# Patient Record
Sex: FEMALE | Race: BLACK OR AFRICAN AMERICAN | NOT HISPANIC OR LATINO | Employment: OTHER | URBAN - METROPOLITAN AREA
[De-identification: names, ages, dates, MRNs, and addresses within clinical notes are randomized per-mention and may not be internally consistent; named-entity substitution may affect disease eponyms.]

---

## 2017-02-16 ENCOUNTER — ALLSCRIPTS OFFICE VISIT (OUTPATIENT)
Dept: OTHER | Facility: OTHER | Age: 82
End: 2017-02-16

## 2017-05-16 DIAGNOSIS — E78.5 HYPERLIPIDEMIA: ICD-10-CM

## 2017-05-16 DIAGNOSIS — I10 ESSENTIAL (PRIMARY) HYPERTENSION: ICD-10-CM

## 2017-05-23 ENCOUNTER — GENERIC CONVERSION - ENCOUNTER (OUTPATIENT)
Dept: OTHER | Facility: OTHER | Age: 82
End: 2017-05-23

## 2017-05-23 ENCOUNTER — LAB CONVERSION - ENCOUNTER (OUTPATIENT)
Dept: OTHER | Facility: OTHER | Age: 82
End: 2017-05-23

## 2017-05-23 LAB
A/G RATIO (HISTORICAL): 1.5 (CALC) (ref 1–2.5)
ALBUMIN SERPL BCP-MCNC: 4.5 G/DL (ref 3.6–5.1)
ALP SERPL-CCNC: 81 U/L (ref 33–130)
ALT SERPL W P-5'-P-CCNC: 19 U/L (ref 6–29)
AST SERPL W P-5'-P-CCNC: 25 U/L (ref 10–35)
BASOPHILS # BLD AUTO: 0.3 %
BASOPHILS # BLD AUTO: 18 CELLS/UL (ref 0–200)
BILIRUB SERPL-MCNC: 0.4 MG/DL (ref 0.2–1.2)
BILIRUB UR QL STRIP: NEGATIVE
BUN SERPL-MCNC: 9 MG/DL (ref 7–25)
BUN/CREA RATIO (HISTORICAL): NORMAL (CALC) (ref 6–22)
CALCIUM SERPL-MCNC: 9.9 MG/DL (ref 8.6–10.4)
CHLORIDE SERPL-SCNC: 104 MMOL/L (ref 98–110)
CHOLEST SERPL-MCNC: 157 MG/DL (ref 125–200)
CHOLEST/HDLC SERPL: 2.1 (CALC)
CO2 SERPL-SCNC: 28 MMOL/L (ref 20–31)
COLOR UR: YELLOW
COMMENT (HISTORICAL): CLEAR
CREAT SERPL-MCNC: 0.79 MG/DL (ref 0.6–0.88)
DEPRECATED RDW RBC AUTO: 14.3 % (ref 11–15)
EGFR AFRICAN AMERICAN (HISTORICAL): 82 ML/MIN/1.73M2
EGFR-AMERICAN CALC (HISTORICAL): 71 ML/MIN/1.73M2
EOSINOPHIL # BLD AUTO: 1.6 %
EOSINOPHIL # BLD AUTO: 98 CELLS/UL (ref 15–500)
FECAL OCCULT BLOOD DIAGNOSTIC (HISTORICAL): NEGATIVE
GAMMA GLOBULIN (HISTORICAL): 3 G/DL (CALC) (ref 1.9–3.7)
GLUCOSE (HISTORICAL): 80 MG/DL (ref 65–99)
GLUCOSE (HISTORICAL): NEGATIVE
HCT VFR BLD AUTO: 36.2 % (ref 35–45)
HDLC SERPL-MCNC: 76 MG/DL
HGB BLD-MCNC: 12.3 G/DL (ref 11.7–15.5)
KETONES UR STRIP-MCNC: NEGATIVE MG/DL
LDL CHOLESTEROL (HISTORICAL): 70 MG/DL (CALC)
LEUKOCYTE ESTERASE UR QL STRIP: NEGATIVE
LYMPHOCYTES # BLD AUTO: 1549 CELLS/UL (ref 850–3900)
LYMPHOCYTES # BLD AUTO: 25.4 %
MCH RBC QN AUTO: 29.9 PG (ref 27–33)
MCHC RBC AUTO-ENTMCNC: 34.1 G/DL (ref 32–36)
MCV RBC AUTO: 87.8 FL (ref 80–100)
MONOCYTES # BLD AUTO: 354 CELLS/UL (ref 200–950)
MONOCYTES (HISTORICAL): 5.8 %
NEUTROPHILS # BLD AUTO: 4081 CELLS/UL (ref 1500–7800)
NEUTROPHILS # BLD AUTO: 66.9 %
NITRITE UR QL STRIP: NEGATIVE
NON-HDL-CHOL (CHOL-HDL) (HISTORICAL): 81 MG/DL (CALC)
PH UR STRIP.AUTO: 7 [PH] (ref 5–8)
PLATELET # BLD AUTO: 314 THOUSAND/UL (ref 140–400)
PMV BLD AUTO: 8.1 FL (ref 7.5–12.5)
POTASSIUM SERPL-SCNC: 3.6 MMOL/L (ref 3.5–5.3)
PROT UR STRIP-MCNC: NEGATIVE MG/DL
RBC # BLD AUTO: 4.12 MILLION/UL (ref 3.8–5.1)
SODIUM SERPL-SCNC: 141 MMOL/L (ref 135–146)
SP GR UR STRIP.AUTO: 1 (ref 1–1.03)
TOTAL PROTEIN (HISTORICAL): 7.5 G/DL (ref 6.1–8.1)
TRIGL SERPL-MCNC: 56 MG/DL
TSH SERPL DL<=0.05 MIU/L-ACNC: 0.9 MIU/L (ref 0.4–4.5)
WBC # BLD AUTO: 6.1 THOUSAND/UL (ref 3.8–10.8)

## 2017-06-09 ENCOUNTER — GENERIC CONVERSION - ENCOUNTER (OUTPATIENT)
Dept: OTHER | Facility: OTHER | Age: 82
End: 2017-06-09

## 2017-08-28 ENCOUNTER — ALLSCRIPTS OFFICE VISIT (OUTPATIENT)
Dept: OTHER | Facility: OTHER | Age: 82
End: 2017-08-28

## 2017-11-28 DIAGNOSIS — E78.5 HYPERLIPIDEMIA: ICD-10-CM

## 2017-12-13 ENCOUNTER — GENERIC CONVERSION - ENCOUNTER (OUTPATIENT)
Dept: OTHER | Facility: OTHER | Age: 82
End: 2017-12-13

## 2017-12-28 ENCOUNTER — GENERIC CONVERSION - ENCOUNTER (OUTPATIENT)
Dept: OTHER | Facility: OTHER | Age: 82
End: 2017-12-28

## 2017-12-28 ENCOUNTER — LAB CONVERSION - ENCOUNTER (OUTPATIENT)
Dept: OTHER | Facility: OTHER | Age: 82
End: 2017-12-28

## 2017-12-28 LAB
A/G RATIO (HISTORICAL): 1.8 (CALC) (ref 1–2.5)
ALBUMIN SERPL BCP-MCNC: 4.6 G/DL (ref 3.6–5.1)
ALP SERPL-CCNC: 63 U/L (ref 33–130)
ALT SERPL W P-5'-P-CCNC: 19 U/L (ref 6–29)
AST SERPL W P-5'-P-CCNC: 24 U/L (ref 10–35)
BILIRUB SERPL-MCNC: 0.4 MG/DL (ref 0.2–1.2)
BUN SERPL-MCNC: 14 MG/DL (ref 7–25)
BUN/CREA RATIO (HISTORICAL): NORMAL (CALC) (ref 6–22)
CALCIUM SERPL-MCNC: 10.1 MG/DL (ref 8.6–10.4)
CHLORIDE SERPL-SCNC: 105 MMOL/L (ref 98–110)
CHOLEST SERPL-MCNC: 166 MG/DL
CHOLEST/HDLC SERPL: 2.1 (CALC)
CO2 SERPL-SCNC: 26 MMOL/L (ref 20–31)
CREAT SERPL-MCNC: 0.83 MG/DL (ref 0.6–0.88)
EGFR AFRICAN AMERICAN (HISTORICAL): 77 ML/MIN/1.73M2
EGFR-AMERICAN CALC (HISTORICAL): 67 ML/MIN/1.73M2
GAMMA GLOBULIN (HISTORICAL): 2.6 G/DL (CALC) (ref 1.9–3.7)
GLUCOSE (HISTORICAL): 81 MG/DL (ref 65–99)
HDLC SERPL-MCNC: 78 MG/DL
LDL CHOLESTEROL (HISTORICAL): 70 MG/DL (CALC)
NON-HDL-CHOL (CHOL-HDL) (HISTORICAL): 88 MG/DL (CALC)
POTASSIUM SERPL-SCNC: 3.8 MMOL/L (ref 3.5–5.3)
SODIUM SERPL-SCNC: 143 MMOL/L (ref 135–146)
TOTAL PROTEIN (HISTORICAL): 7.2 G/DL (ref 6.1–8.1)
TRIGL SERPL-MCNC: 96 MG/DL
TSH SERPL DL<=0.05 MIU/L-ACNC: 0.89 MIU/L (ref 0.4–4.5)

## 2018-01-12 VITALS
WEIGHT: 126.25 LBS | HEIGHT: 59 IN | DIASTOLIC BLOOD PRESSURE: 80 MMHG | SYSTOLIC BLOOD PRESSURE: 140 MMHG | BODY MASS INDEX: 25.45 KG/M2 | TEMPERATURE: 98.8 F

## 2018-01-13 NOTE — RESULT NOTES
Verified Results  * DXA BONE DENSITY SPINE HIP AND PELVIS [id number] 10:35AM Leonel Everett Order Number: [id number]     Test Name Result Flag Reference   DXA BONE DENSITY SPINE HIP AND PELVIS (Report)     CENTRAL DXA SCAN     CLINICAL HISTORY:  80year old post-menopausal Black female risk factors include postmenopausal, estrogen deficiency  TECHNIQUE: Bone densitometry was performed using a [employer] W bone densitometer  Regions of interest appear properly placed  There are no obvious fractures or other confounding variables which could limit the study  Significant degenerative or    osteoarthritic changes are noted on the spine image and in my view eliminating the spine result as reliable  L3 and L4 are excluded from the computer generated result  COMPARISON: Baseline     RESULTS:    LUMBAR SPINE: L1-L2:   BMD 0 891 gm/cm2   T-score below normal, -1 7   Z-score +1 1     LEFT TOTAL HIP:   BMD 0 856 gm/cm2   T-score below normal, -1 1   Z-score 0 4     LEFT FEMORAL NECK:   BMD 0 741 gm/cm2   T-score below normal, -1 5   Z-score 0 3     The forearm BMD is 0 648 and the T score is normal, -0 8  The Z score is +2 3  The Frax score in this patient identifies the risk of a major osteoporotic fracture in the next 10 years at 5 2% and a hip fracture risk of 1 0%, below treatment thresholds  IMPRESSION:   1  Based on the [facility] classification, this study identifies osteopenia at the femoral neck, total hip, and spine areas and the patient remains at low risk for fracture  2  A daily intake of calcium of at least 1200 mg and vitamin D, 800-1000 IU, as well as weight bearing and muscle strengthening exercise, fall prevention and avoidance of tobacco and excessive alcohol intake as basic preventive measures are recommended  3  Repeat DXA scan on the same equipment in 24-36 months as clinically indicated        WHO CLASSIFICATION:   Normal (a T-score of -1 0 or higher)   Low bone mineral density (a T-score of less than -1 0 but higher than -2 5)   Osteoporosis (a T-score of -2 5 or less)   Severe osteoporosis (a T-score of -2 5 or less with a fragility fracture)     Thank you for allowing us the opportunity to participate in your patient care  The expanded DEXA report will no longer be arriving in your mail  If you desire to view the full report please contact [address] [place] [address] or access the PACS system  Workstation performed: [id number]     Signed by:   Bria Heredia MD   8/29/16       Discussion/Summary   dexa shows some osteopenia  She should be taking 1500 mg of calcium and 800 units of vitamin D per day

## 2018-01-13 NOTE — RESULT NOTES
Verified Results  (1) CBC/PLT/DIFF 12Apr2016 10:34AM Rojelio Ivans     Test Name Result Flag Reference   WHITE BLOOD CELL COUNT 5 0 Thousand/uL  3 8-10 8   RED BLOOD CELL COUNT 4 23 Million/uL  3 80-5 10   HEMOGLOBIN 12 3 g/dL  11 7-15 5   HEMATOCRIT 38 0 %  35 0-45 0   MCV 89 8 fL  80 0-100 0   MCH 29 0 pg  27 0-33 0   MCHC 32 3 g/dL  32 0-36 0   RDW 14 7 %  11 0-15 0   PLATELET COUNT 311 Thousand/uL  140-400   MPV 8 8 fL  7 5-11 5   ABSOLUTE NEUTROPHILS 2565 cells/uL  4200-9516   ABSOLUTE LYMPHOCYTES 1860 cells/uL  850-3900   ABSOLUTE MONOCYTES 485 cells/uL  200-950   ABSOLUTE EOSINOPHILS 70 cells/uL     ABSOLUTE BASOPHILS 20 cells/uL  0-200   NEUTROPHILS 51 3 %     LYMPHOCYTES 37 2 %     MONOCYTES 9 7 %     EOSINOPHILS 1 4 %     BASOPHILS 0 4 %       (1) COMPREHENSIVE METABOLIC PANEL [id number] 69:04QC Rojelio Ivans     Test Name Result Flag Reference   GLUCOSE 78 mg/dL  65-99   Fasting reference interval   UREA NITROGEN (BUN) 16 mg/dL  7-25   CREATININE 0 84 mg/dL  0 60-0 93   For patients >52years of age, the reference limit  for Creatinine is approximately 13% higher for people  identified as -American  eGFR NON-AFR   AMERICAN 66 mL/min/1 73m2  > OR = 60   eGFR AFRICAN AMERICAN 77 mL/min/1 73m2  > OR = 60   BUN/CREATININE RATIO   7-57   NOT APPLICABLE (calc)   SODIUM 140 mmol/L  135-146   POTASSIUM 3 8 mmol/L  3 5-5 3   CHLORIDE 104 mmol/L     CARBON DIOXIDE 26 mmol/L  19-30   CALCIUM 10 0 mg/dL  8 6-10 4   PROTEIN, TOTAL 7 2 g/dL  6 1-8 1   ALBUMIN 4 2 g/dL  3 6-5 1   GLOBULIN 3 0 g/dL (calc)  1 9-3 7   ALBUMIN/GLOBULIN RATIO 1 4 (calc)  1 0-2 5   BILIRUBIN, TOTAL 0 4 mg/dL  0 2-1 2   ALKALINE PHOSPHATASE 82 U/L     AST 22 U/L  10-35   ALT 14 U/L  6-29     (1) LIPID PANEL, FASTING 12Apr2016 10:34AM Rojelio Ivans     Test Name Result Flag Reference   CHOLESTEROL, TOTAL 171 mg/dL  125-200   HDL CHOLESTEROL 70 mg/dL  > OR = 46   TRIGLICERIDES 74 mg/dL  <361   LDL-CHOLESTEROL 86 mg/dL (calc)  <130   Desirable range <100 mg/dL for patients with CHD or  diabetes and <70 mg/dL for diabetic patients with  known heart disease  CHOL/HDLC RATIO 2 4 (calc)  < OR = 5 0   NON HDL CHOLESTEROL 101 mg/dL (calc)     Target for non-HDL cholesterol is 30 mg/dL higher than   LDL cholesterol target  (1) T4, FREE [id number] 10:34AM Noreene Lease     Test Name Result Flag Reference   T4, FREE 1 0 ng/dL  0 8-1 8     (Q) TSH, 3RD GENERATION [id number] 10:34AM Noreene Lease     Test Name Result Flag Reference   TSH 0 82 mIU/L  0 40-4 50     (Q) URINALYSIS REFLEX [id number] 10:34AM Noreene Lease   REPORT COMMENT:  FASTING:YES     Test Name Result Flag Reference   COLOR YELLOW  YELLOW   APPEARANCE CLEAR  CLEAR   SPECIFIC GRAVITY 1 006  1 001-1 035   PH 7 5  5 0-8 0   GLUCOSE NEGATIVE  NEGATIVE   BILIRUBIN NEGATIVE  NEGATIVE   KETONES NEGATIVE  NEGATIVE   OCCULT BLOOD NEGATIVE  NEGATIVE   PROTEIN NEGATIVE  NEGATIVE   NITRITE NEGATIVE  NEGATIVE   LEUKOCYTE ESTERASE NEGATIVE  NEGATIVE       Discussion/Summary   Lab work was pretty good  Continue current medications  I will discuss this with her when she comes in next month for her checkup

## 2018-01-14 VITALS — WEIGHT: 128 LBS | SYSTOLIC BLOOD PRESSURE: 138 MMHG | BODY MASS INDEX: 25.85 KG/M2 | DIASTOLIC BLOOD PRESSURE: 80 MMHG

## 2018-01-15 NOTE — RESULT NOTES
Verified Results  DXA FOLLOW UP (NO CHARGE) [id number] 10:51AM Goyo Virgen Order Number: [id number]     Test Name Result Flag Reference   DXA FOLLOW UP (NO CHARGE) (Report)     CENTRAL DXA SCAN     CLINICAL HISTORY:  80year old post-menopausal Black female risk factors include postmenopausal, estrogen deficiency  TECHNIQUE: Bone densitometry was performed using a [employer]'s W bone densitometer  Regions of interest appear properly placed  There are no obvious fractures or other confounding variables which could limit the study  Significant degenerative or    osteoarthritic changes are noted on the spine image and in my view eliminating the spine result as reliable  L3 and L4 are excluded from the computer generated result  COMPARISON: Baseline     RESULTS:    LUMBAR SPINE: L1-L2:   BMD 0 891 gm/cm2   T-score below normal, -1 7   Z-score +1 1     LEFT TOTAL HIP:   BMD 0 856 gm/cm2   T-score below normal, -1 1   Z-score 0 4     LEFT FEMORAL NECK:   BMD 0 741 gm/cm2   T-score below normal, -1 5   Z-score 0 3     The forearm BMD is 0 648 and the T score is normal, -0 8  The Z score is +2 3  The Frax score in this patient identifies the risk of a major osteoporotic fracture in the next 10 years at 5 2% and a hip fracture risk of 1 0%, below treatment thresholds  IMPRESSION:   1  Based on the [facility] classification, this study identifies osteopenia at the femoral neck, total hip, and spine areas and the patient remains at low risk for fracture  2  A daily intake of calcium of at least 1200 mg and vitamin D, 800-1000 IU, as well as weight bearing and muscle strengthening exercise, fall prevention and avoidance of tobacco and excessive alcohol intake as basic preventive measures are recommended  3  Repeat DXA scan on the same equipment in 24-36 months as clinically indicated        WHO CLASSIFICATION:   Normal (a T-score of -1 0 or higher)   Low bone mineral density (a T-score of less than -1 0 but higher than -2 5)   Osteoporosis (a T-score of -2 5 or less)   Severe osteoporosis (a T-score of -2 5 or less with a fragility fracture)     Thank you for allowing us the opportunity to participate in your patient care  The expanded DEXA report will no longer be arriving in your mail  If you desire to view the full report please contact [address] [place] [address] or access the PACS system  Workstation performed: [id number]     Signed by:   Karina Jaffe MD   8/30/16       Discussion/Summary   Patient has mild osteopenia, low risk for fracture  Would still recommend she take 1500 mg of calcium and 800 units of vitamin D per day

## 2018-01-15 NOTE — RESULT NOTES
Discussion/Summary   Lab work all okay  Continue current medications  Keep scheduled appointment in July  Verified Results  (1) COMPREHENSIVE METABOLIC PANEL [id number] 55:35FV [employer]     Test Name Result Flag Reference   GLUCOSE 80 mg/dL  65-99   Fasting reference interval   UREA NITROGEN (BUN) 9 mg/dL  7-25   CREATININE 0 79 mg/dL  0 60-0 88   For patients >52years of age, the reference limit  for Creatinine is approximately 13% higher for people  identified as -American  eGFR NON-AFR  AMERICAN 71 mL/min/1 73m2  > OR = 60   eGFR AFRICAN AMERICAN 82 mL/min/1 73m2  > OR = 60   BUN/CREATININE RATIO   5-56   NOT APPLICABLE (calc)   SODIUM 141 mmol/L  135-146   POTASSIUM 3 6 mmol/L  3 5-5 3   CHLORIDE 104 mmol/L     CARBON DIOXIDE 28 mmol/L  20-31   CALCIUM 9 9 mg/dL  8 6-10 4   PROTEIN, TOTAL 7 5 g/dL  6 1-8 1   ALBUMIN 4 5 g/dL  3 6-5 1   GLOBULIN 3 0 g/dL (calc)  1 9-3 7   ALBUMIN/GLOBULIN RATIO 1 5 (calc)  1 0-2 5   BILIRUBIN, TOTAL 0 4 mg/dL  0 2-1 2   ALKALINE PHOSPHATASE 81 U/L     AST 25 U/L  10-35   ALT 19 U/L  6-29     (1) LIPID PANEL, FASTING [id number] 11:20AM [employer]     Test Name Result Flag Reference   CHOLESTEROL, TOTAL 157 mg/dL  125-200   HDL CHOLESTEROL 76 mg/dL  > OR = 46   TRIGLICERIDES 56 mg/dL  <934   LDL-CHOLESTEROL 70 mg/dL (calc)  <130   Desirable range <100 mg/dL for patients with CHD or  diabetes and <70 mg/dL for diabetic patients with  known heart disease  CHOL/HDLC RATIO 2 1 (calc)  < OR = 5 0   NON HDL CHOLESTEROL 81 mg/dL (calc)     Target for non-HDL cholesterol is 30 mg/dL higher than   LDL cholesterol target       (1) CBC/PLT/DIFF [id number] 11:20AM [employer]     Test Name Result Flag Reference   WHITE BLOOD CELL COUNT 6 1 Thousand/uL  3 8-10 8   RED BLOOD CELL COUNT 4 12 Million/uL  3 80-5 10   HEMOGLOBIN 12 3 g/dL  11 7-15 5   HEMATOCRIT 36 2 %  35 0-45 0   MCV 87 8 fL  80 0-100 0   MCH 29 9 pg  27 0-33 0   MCHC 34 1 g/dL  32 0-36 0 RDW 14 3 %  11 0-15 0   PLATELET COUNT 663 Thousand/uL  140-400   ABSOLUTE NEUTROPHILS 4081 cells/uL  0681-4984   ABSOLUTE LYMPHOCYTES 1549 cells/uL  850-3900   ABSOLUTE MONOCYTES 354 cells/uL  200-950   ABSOLUTE EOSINOPHILS 98 cells/uL     ABSOLUTE BASOPHILS 18 cells/uL  0-200   NEUTROPHILS 66 9 %     LYMPHOCYTES 25 4 %     MONOCYTES 5 8 %     EOSINOPHILS 1 6 %     BASOPHILS 0 3 %     MPV 8 1 fL  7 5-12 5     (Q) TSH, 3RD GENERATION [id number] 11:20AM Alma Delia Champion     Test Name Result Flag Reference   TSH 0 90 mIU/L  0 40-4 50     (Q) URINALYSIS REFLEX [id number] 11:20AM Alma Delia Champion   REPORT COMMENT:  FASTING:YES     Test Name Result Flag Reference   COLOR YELLOW  YELLOW   APPEARANCE CLEAR  CLEAR   SPECIFIC GRAVITY 1 002  1 001-1 035   PH 7 0  5 0-8 0   GLUCOSE NEGATIVE  NEGATIVE   BILIRUBIN NEGATIVE  NEGATIVE   KETONES NEGATIVE  NEGATIVE   OCCULT BLOOD NEGATIVE  NEGATIVE   PROTEIN NEGATIVE  NEGATIVE   NITRITE NEGATIVE  NEGATIVE   LEUKOCYTE ESTERASE NEGATIVE  NEGATIVE

## 2018-01-22 ENCOUNTER — ALLSCRIPTS OFFICE VISIT (OUTPATIENT)
Dept: OTHER | Facility: OTHER | Age: 83
End: 2018-01-22

## 2018-01-23 NOTE — RESULT NOTES
Discussion/Summary   lab ok, continue current meds  will discuss at ov next months  Verified Results  (1) COMPREHENSIVE METABOLIC PANEL [id number] 31:89NZ Shadi Pert     Test Name Result Flag Reference   GLUCOSE 81 mg/dL  65-99   Fasting reference interval   UREA NITROGEN (BUN) 14 mg/dL  7-25   CREATININE 0 83 mg/dL  0 60-0 88   For patients >52years of age, the reference limit  for Creatinine is approximately 13% higher for people  identified as -American  eGFR NON-AFR  AMERICAN 67 mL/min/1 73m2  > OR = 60   eGFR AFRICAN AMERICAN 77 mL/min/1 73m2  > OR = 60   BUN/CREATININE RATIO   0-86   NOT APPLICABLE (calc)   SODIUM 143 mmol/L  135-146   POTASSIUM 3 8 mmol/L  3 5-5 3   CHLORIDE 105 mmol/L     CARBON DIOXIDE 26 mmol/L  20-31   CALCIUM 10 1 mg/dL  8 6-10 4   PROTEIN, TOTAL 7 2 g/dL  6 1-8 1   ALBUMIN 4 6 g/dL  3 6-5 1   GLOBULIN 2 6 g/dL (calc)  1 9-3 7   ALBUMIN/GLOBULIN RATIO 1 8 (calc)  1 0-2 5   BILIRUBIN, TOTAL 0 4 mg/dL  0 2-1 2   ALKALINE PHOSPHATASE 63 U/L     AST 24 U/L  10-35   ALT 19 U/L  6-29     (1) LIPID PANEL, FASTING [id number] 11:30AM Shadi Pert     Test Name Result Flag Reference   CHOLESTEROL, TOTAL 166 mg/dL  <200   HDL CHOLESTEROL 78 mg/dL  >79   TRIGLICERIDES 96 mg/dL  <014   LDL-CHOLESTEROL 70 mg/dL (calc)     Reference range: <100     Desirable range <100 mg/dL for patients with CHD or  diabetes and <70 mg/dL for diabetic patients with  known heart disease  LDL-C is now calculated using the [employer]agar-Page   calculation, which is a validated novel method providing   better accuracy than the Friedewald equation in the   estimation of LDL-C  Adron Cal sanchez al  [place] 192922;756(63): [phone]   (http://[employer]/faq/RUB775)   CHOL/HDLC RATIO 2 1 (calc)  <5 0   NON HDL CHOLESTEROL 88 mg/dL (calc)  <130   For patients with diabetes plus 1 major ASCVD risk   factor, treating to a non-HDL-C goal of <100 mg/dL   (LDL-C of <70 mg/dL) is considered a therapeutic   option       (Q) TSH, 3RD GENERATION [id number] 11:30AM Patti Morales   REPORT COMMENT:  FASTING:YES     Test Name Result Flag Reference   TSH 0 89 mIU/L  0 40-4 50

## 2018-01-23 NOTE — PROGRESS NOTES
Assessment   1  Essential hypertension (401 9) (I10)   2  Hyperlipidemia (272 4) (E78 5)   3  Anxiety (300 00) (F41 9)   4  Osteoarthritis (715 90) (M19 90)   5  Osteoporosis (733 00) (M81 0)   6  Thinning hair (704 00) (L65 9)    Plan   Essential hypertension    · Amlodipine Besy-Benazepril HCl - 10-20 MG Oral Capsule; TAKE ONE    CAPSULE BY MOUTH ONCE DAILY  Hyperlipidemia    · Simvastatin 40 MG Oral Tablet (Zocor); TAKE ONE TABLET BY MOUTH ONCE    DAILY   · (1) CBC/PLT/DIFF; Status:Active; Requested for:61Ffr2043;    · (1) COMPREHENSIVE METABOLIC PANEL; Status:Active; Requested for:22Cyb1969;    · (1) LIPID PANEL, FASTING; Status:Active; Requested for:10Lzn7736;    · (1) TSH; Status:Active; Requested for:67Oji4495;   Osteoporosis, Thinning hair    · (1) VITAMIN D 25-HYDROXY; Status:Active; Requested for:68Bjc2840; Thinning hair    · Clobetasol Propionate 0 05 % External Solution; APPLY AND GENTLY MASSAGE    INTO AFFECTED AREA(S) ONCE DAILY   · (1) VITAMIN A; Status:Active; Requested for:37Xgq1570;    · (1) VITAMIN E; Status:Active; Requested for:76Ozm1173;       Continue current medications  Recheck lab in 6 months  I will see her at that time  She is to call his any problems  Chief Complaint   follow up htn,lipid  no refills needed  review blood,work      History of Present Illness   Patient is an 79-year-old female presenting to the office for follow-up on hypertension, hyperlipidemia, arthritis, osteoporosis, and anxiety  He is doing okay  She is complaining of thinning hair  Wonders if there are any options for that  She has stopped getting hair treatments because of it  Review of Systems        Constitutional: No fever, no chills, feels well, no tiredness, no recent weight gain or weight loss  Eyes: No complaints of eye pain, no red eyes, no eyesight problems, no discharge, no dry eyes, no itching of eyes        ENT: no complaints of earache, no loss of hearing, no nose bleeds, no nasal discharge, no sore throat, no hoarseness  Cardiovascular: No complaints of slow heart rate, no fast heart rate, no chest pain, no palpitations, no leg claudication, no lower extremity edema  Respiratory: No complaints of shortness of breath, no wheezing, no cough, no SOB on exertion, no orthopnea, no PND  Gastrointestinal: No complaints of abdominal pain, no constipation, no nausea or vomiting, no diarrhea, no bloody stools  Genitourinary: No complaints of dysuria, no incontinence, no pelvic pain, no dysmenorrhea, no vaginal discharge or bleeding  Musculoskeletal: No complaints of arthralgias, no myalgias, no joint swelling or stiffness, no limb pain or swelling  Integumentary: as noted in HPI  Neurological: No complaints of headache, no confusion, no convulsions, no numbness, no dizziness or fainting, no tingling, no limb weakness, no difficulty walking  Psychiatric: anxiety-- and-- Continues to have stress , but she is coping  Endocrine: No complaints of proptosis, no hot flashes, no muscle weakness, no deepening of the voice, no feelings of weakness  Hematologic/Lymphatic: No complaints of swollen glands, no swollen glands in the neck, does not bleed easily, does not bruise easily  Active Problems   1  Anxiety (300 00) (F41 9)   2  Essential hypertension (401 9) (I10)   3  Glaucoma (365 9) (H40 9)   4  Hyperlipidemia (272 4) (E78 5)   5  Osteoarthritis (715 90) (M19 90)   6  Osteoporosis (733 00) (M81 0)   7  Sleep apnea (780 57) (G47 30)    Past Medical History   1  History of Ankle pain, unspecified laterality   2  History of Bone Pain In The Foot   3  History of Colonoscopy (Fiberoptic) Screening   4  History of Dyspepsia (536 8) (K30)   5  History of Encounter for routine gynecological examination (V72 31) (Z01 419)   6  History of Encounter for screening mammogram for malignant neoplasm of breast     (V76 12) (Z12 31)   7   History of ataxia (V15 89) (V84 671)   8  History of chest pain (V13 89) (Z87 898)   9  History of common cold (V12 09) (Z86 19)   10  History of dehydration (V12 29) (Z86 39)   11  History of Impacted cerumen of both ears (380 4) (H61 23)   12  History of Impacted cerumen of left ear (380 4) (H61 22)   13  History of Impacted cerumen, unspecified laterality   14  History of Jaw Cyst (526 2)   15  History of Medicare annual wellness visit, subsequent (V70 0) (Z00 00)   16  History of Murmur (785 2) (R01 1)   17  History of Right carotid bruit (785 9) (R09 89)   18  History of Screening for genitourinary condition (V81 6) (Z13 89)   19  History of Screening for osteoporosis (V82 81) (Z13 820)   20  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)     The active problems and past medical history were reviewed and updated today  Surgical History   1  History of Complete Colonoscopy   2  History of Tonsillectomy With Adenoidectomy   3  History of Total Abdominal Hysterectomy     The surgical history was reviewed and updated today  Family History   Mother    1  No pertinent family history  Family History    2  Family history of CABG   3  Family history of Hypertension (V17 49)   4  Family history of Transient Ischemic Attack     The family history was reviewed and updated today  Social History    · Denied: History of Alcohol Use (History)   · Denied: History of Drug Use   · Denied: History of Housing Without Smoke Detectors   · Never A Smoker   · Non-smoker (V49 89) (Z78 9)   · Denied: History of Tobacco Use   · Uses Safety Equipment - Seatbelts  The social history was reviewed and updated today  The social history was reviewed and is unchanged  Current Meds    1  Amlodipine Besy-Benazepril HCl - 10-20 MG Oral Capsule; TAKE ONE CAPSULE BY     MOUTH ONCE DAILY; Therapy: 25AJS4618 to (Evaluate:71Jpy7505)  Requested for: 92YDY9132; Last     Rx:19Jun2017 Ordered   2  Aspirin 81 MG TABS; TAKE 1 TABLET DAILY;      Therapy: 78UNL8030 to (Evaluate:22Jun2013); Last Rx:96Qlp6496 Ordered   3  Calcium-Magnesium 500-250 MG Oral Tablet; Take 1 tablet daily Recorded   4  [employer] Melatonin 3 MG Oral Tablet; Take as directed Recorded   5  [employer] Vitamin D3 400 UNIT CAPS; TAKE 1 CAPSULE Daily Recorded   6  Daily Multi Oral Tablet Recorded   7  HydrALAZINE HCl - 25 MG Oral Tablet; take one tablet by mouth twice daily; Therapy: 13AZF8911 to (Yossi Mcghee)  Requested for: 84SSD9961; Last     Rx:08Jan2018 Ordered   8  Hydrogen Peroxide 3 % External Solution; use as directed; Therapy: 77CYE5614 to (Last Rx:71Hlm1547)  Requested for: 62CPS1837 Ordered   9  LORazepam 0 5 MG Oral Tablet; TAKE ONE TABLET BY MOUTH TWICE DAILY AS     NEEDED; Therapy: 51FRL9040 to (Evaluate:10Apr2018)  Requested for: 50AOG1972; Last     Rx:61Sqw2204 Ordered   10  Lumigan 0 03 % SOLN; INSTILL 1 DROP INTO BOTH EYES ONCE DAILY IN THE      EVENING Recorded   11  Metoprolol Succinate ER 50 MG Oral Tablet Extended Release 24 Hour; TAKE ONE      TABLET BY MOUTH THREE TIMES DAILY; Therapy: 95YNI2997 to (Evaluate:64Nsw2920)  Requested for: 52GGW6574; Last      Rx:28Mhc7965 Ordered   12  Simvastatin 40 MG Oral Tablet; TAKE ONE TABLET BY MOUTH ONCE DAILY; Therapy: 49NPX1294 to (Evaluate:08Jan2018)  Requested for: 83SJW6270; Last      Rx:52Ohk5738 Ordered     The medication list was reviewed and updated today  Allergies   1  Clonidine and derivs    Vitals   Vital Signs    Recorded: 09GGA1885 03:65XK   Systolic 706   Diastolic 82   Height 4 ft 11 in   Weight 126 lb    BMI Calculated 25 45   BSA Calculated 1 52     Physical Exam        Constitutional      General appearance: No acute distress, well appearing and well nourished  Eyes      Conjunctiva and lids: No swelling, erythema or discharge  Pupils and irises: Equal, round and reactive to light         Ears, Nose, Mouth, and Throat      External inspection of ears and nose: Normal        Otoscopic examination: Tympanic membranes translucent with normal light reflex  Canals patent without erythema  Nasal mucosa, septum, and turbinates: Normal without edema or erythema  Oropharynx: Normal with no erythema, edema, exudate or lesions  Pulmonary      Respiratory effort: No increased work of breathing or signs of respiratory distress  Auscultation of lungs: Clear to auscultation  Cardiovascular      Palpation of heart: Normal PMI, no thrills  Auscultation of heart: Normal rate and rhythm, normal S1 and S2, without murmurs  Examination of extremities for edema and/or varicosities: Normal        Carotid pulses: Normal        Abdomen      Abdomen: Non-tender, no masses  Liver and spleen: No hepatomegaly or splenomegaly  Lymphatic      Palpation of lymph nodes in neck: No lymphadenopathy  Musculoskeletal      Gait and station: Normal        Digits and nails: Normal without clubbing or cyanosis  Inspection/palpation of joints, bones, and muscles: Normal        Skin      Skin and subcutaneous tissue: Normal without rashes or lesions  -- Generalized thinning of her hair, no rash  Neurologic      Cranial nerves: Cranial nerves 2-12 intact  Reflexes: 2+ and symmetric  Sensation: No sensory loss  Psychiatric      Orientation to person, place, and time: Normal        Mood and affect: Normal           Results/Data   (1) COMPREHENSIVE METABOLIC PANEL 82WAZ7119 22:01YY [place] Basket      Test Name Result Flag Reference   GLUCOSE 81 mg/dL  65-99   Fasting reference interval   UREA NITROGEN (BUN) 14 mg/dL  7-25   CREATININE 0 83 mg/dL  0 60-0 88   For patients >52years of age, the reference limit     for Creatinine is approximately 13% higher for people     identified as -American  eGFR NON-AFR   AMERICAN 67 mL/min/1 73m2  > OR = 60   eGFR AFRICAN AMERICAN 77 mL/min/1 73m2  > OR = 60   BUN/CREATININE RATIO   0-91   NOT APPLICABLE (calc)   SODIUM 143 mmol/L  135-146   POTASSIUM 3 8 mmol/L  3 5-5 3   CHLORIDE 105 mmol/L     CARBON DIOXIDE 26 mmol/L  20-31   CALCIUM 10 1 mg/dL  8 6-10 4   PROTEIN, TOTAL 7 2 g/dL  6 1-8 1   ALBUMIN 4 6 g/dL  3 6-5 1   GLOBULIN 2 6 g/dL (calc)  1 9-3 7   ALBUMIN/GLOBULIN RATIO 1 8 (calc)  1 0-2 5   BILIRUBIN, TOTAL 0 4 mg/dL  0 2-1 2   ALKALINE PHOSPHATASE 63 U/L     AST 24 U/L  10-35   ALT 19 U/L  6-29      (1) LIPID PANEL, FASTING 40Lzr7541 11:30AM Dorota Martin      Test Name Result Flag Reference   CHOLESTEROL, TOTAL 166 mg/dL  <200   HDL CHOLESTEROL 78 mg/dL  >98   TRIGLICERIDES 96 mg/dL  <104   LDL-CHOLESTEROL 70 mg/dL (calc)     Reference range: <100           Desirable range <100 mg/dL for patients with CHD or     diabetes and <70 mg/dL for diabetic patients with     known heart disease  LDL-C is now calculated using the Sagar-Page      calculation, which is a validated novel method providing      better accuracy than the Friedewald equation in the      estimation of LDL-C  Manuel Cortez et al  [facility]  0774;000(13): 9566-9227      (http://[employer]/faq/GWG322)   CHOL/HDLC RATIO 2 1 (calc)  <5 0   NON HDL CHOLESTEROL 88 mg/dL (calc)  <130   For patients with diabetes plus 1 major ASCVD risk      factor, treating to a non-HDL-C goal of <100 mg/dL      (LDL-C of <70 mg/dL) is considered a therapeutic      option        (Q) TSH, 3RD GENERATION [id number] 11:30AM Dorota Martin   REPORT COMMENT:     FASTING:YES      Test Name Result Flag Reference   TSH 0 89 mIU/L  0 40-4 50      Meli Stephens - Eye Exam 52UYQ5328 12:00AM       Test Name Result Flag Reference   Retinopathy Screening      Retinopathy Screening not performed      Summary / No summary entered :        No summary entered  Documents attached :        sOpthalmology - Dorota Martin; Enc: [id number] - Image Encounter - Dorota Martin -        (Family Medicine) (Result Document)  Health Management   Health Maintenance Medicare Annual Wellness Visit; every 1 year; Next Due: [id number];  Overdue    Signatures    Electronically signed by : Ammy Quinonez DO; Jan 22 2018  3:22PM EST                       (Author)

## 2018-02-15 DIAGNOSIS — I10 ESSENTIAL HYPERTENSION: Primary | ICD-10-CM

## 2018-02-15 RX ORDER — METOPROLOL SUCCINATE 50 MG/1
TABLET, EXTENDED RELEASE ORAL
Qty: 90 TABLET | Refills: 1 | Status: SHIPPED | OUTPATIENT
Start: 2018-02-15 | End: 2018-04-17 | Stop reason: SDUPTHER

## 2018-04-11 DIAGNOSIS — F41.1 GENERALIZED ANXIETY DISORDER: ICD-10-CM

## 2018-04-11 DIAGNOSIS — I10 ESSENTIAL HYPERTENSION: Primary | ICD-10-CM

## 2018-04-11 RX ORDER — LORAZEPAM 0.5 MG/1
TABLET ORAL
Qty: 60 TABLET | Refills: 3 | Status: SHIPPED | OUTPATIENT
Start: 2018-04-11 | End: 2018-08-09 | Stop reason: SDUPTHER

## 2018-04-11 RX ORDER — HYDRALAZINE HYDROCHLORIDE 25 MG/1
TABLET, FILM COATED ORAL
Qty: 90 TABLET | Refills: 1 | Status: SHIPPED | OUTPATIENT
Start: 2018-04-11 | End: 2018-07-11 | Stop reason: SDUPTHER

## 2018-04-17 DIAGNOSIS — I10 ESSENTIAL HYPERTENSION: ICD-10-CM

## 2018-04-17 RX ORDER — METOPROLOL SUCCINATE 50 MG/1
TABLET, EXTENDED RELEASE ORAL
Qty: 90 TABLET | Refills: 1 | Status: SHIPPED | OUTPATIENT
Start: 2018-04-17 | End: 2018-06-16 | Stop reason: SDUPTHER

## 2018-06-16 DIAGNOSIS — I10 ESSENTIAL HYPERTENSION: ICD-10-CM

## 2018-06-17 RX ORDER — METOPROLOL SUCCINATE 50 MG/1
TABLET, EXTENDED RELEASE ORAL
Qty: 90 TABLET | Refills: 1 | Status: SHIPPED | OUTPATIENT
Start: 2018-06-17 | End: 2018-08-16 | Stop reason: SDUPTHER

## 2018-07-11 DIAGNOSIS — I10 ESSENTIAL HYPERTENSION: ICD-10-CM

## 2018-07-11 RX ORDER — HYDRALAZINE HYDROCHLORIDE 25 MG/1
TABLET, FILM COATED ORAL
Qty: 90 TABLET | Refills: 1 | Status: SHIPPED | OUTPATIENT
Start: 2018-07-11 | End: 2018-10-08 | Stop reason: SDUPTHER

## 2018-07-22 DIAGNOSIS — L65.9 NONSCARRING HAIR LOSS: ICD-10-CM

## 2018-07-22 DIAGNOSIS — E78.5 HYPERLIPIDEMIA: ICD-10-CM

## 2018-07-22 DIAGNOSIS — M81.0 AGE-RELATED OSTEOPOROSIS WITHOUT CURRENT PATHOLOGICAL FRACTURE: ICD-10-CM

## 2018-07-26 LAB
25(OH)D3 SERPL-MCNC: 39 NG/ML (ref 30–100)
A-TOCOPHEROL VIT E SERPL-MCNC: 16.1 MG/L (ref 5.7–19.9)
ALBUMIN SERPL-MCNC: 4.4 G/DL (ref 3.6–5.1)
ALBUMIN/GLOB SERPL: 1.5 (CALC) (ref 1–2.5)
ALP SERPL-CCNC: 69 U/L (ref 33–130)
ALT SERPL-CCNC: 17 U/L (ref 6–29)
AST SERPL-CCNC: 21 U/L (ref 10–35)
BASOPHILS # BLD AUTO: 18 CELLS/UL (ref 0–200)
BASOPHILS NFR BLD AUTO: 0.4 %
BETA+GAMMA TOCOPHEROL SERPL-MCNC: <1 MG/L
BILIRUB SERPL-MCNC: 0.4 MG/DL (ref 0.2–1.2)
BUN SERPL-MCNC: 12 MG/DL (ref 7–25)
BUN/CREAT SERPL: NORMAL (CALC) (ref 6–22)
CALCIUM SERPL-MCNC: 10.1 MG/DL (ref 8.6–10.4)
CHLORIDE SERPL-SCNC: 101 MMOL/L (ref 98–110)
CHOLEST SERPL-MCNC: 148 MG/DL
CHOLEST/HDLC SERPL: 2.1 (CALC)
CO2 SERPL-SCNC: 27 MMOL/L (ref 20–31)
CREAT SERPL-MCNC: 0.77 MG/DL (ref 0.6–0.88)
EOSINOPHIL # BLD AUTO: 51 CELLS/UL (ref 15–500)
EOSINOPHIL NFR BLD AUTO: 1.1 %
ERYTHROCYTE [DISTWIDTH] IN BLOOD BY AUTOMATED COUNT: 13.7 % (ref 11–15)
GLOBULIN SER CALC-MCNC: 2.9 G/DL (CALC) (ref 1.9–3.7)
GLUCOSE SERPL-MCNC: 83 MG/DL (ref 65–99)
HCT VFR BLD AUTO: 35.4 % (ref 35–45)
HDLC SERPL-MCNC: 72 MG/DL
HGB BLD-MCNC: 11.9 G/DL (ref 11.7–15.5)
LDLC SERPL CALC-MCNC: 62 MG/DL (CALC)
LYMPHOCYTES # BLD AUTO: 1454 CELLS/UL (ref 850–3900)
LYMPHOCYTES NFR BLD AUTO: 31.6 %
MCH RBC QN AUTO: 29.9 PG (ref 27–33)
MCHC RBC AUTO-ENTMCNC: 33.6 G/DL (ref 32–36)
MCV RBC AUTO: 88.9 FL (ref 80–100)
MONOCYTES # BLD AUTO: 363 CELLS/UL (ref 200–950)
MONOCYTES NFR BLD AUTO: 7.9 %
NEUTROPHILS # BLD AUTO: 2714 CELLS/UL (ref 1500–7800)
NEUTROPHILS NFR BLD AUTO: 59 %
NONHDLC SERPL-MCNC: 76 MG/DL (CALC)
PLATELET # BLD AUTO: 316 THOUSAND/UL (ref 140–400)
PMV BLD REES-ECKER: 10.3 FL (ref 7.5–12.5)
POTASSIUM SERPL-SCNC: 3.7 MMOL/L (ref 3.5–5.3)
PROT SERPL-MCNC: 7.3 G/DL (ref 6.1–8.1)
RBC # BLD AUTO: 3.98 MILLION/UL (ref 3.8–5.1)
SL AMB EGFR AFRICAN AMERICAN: 84 ML/MIN/1.73M2
SL AMB EGFR NON AFRICAN AMERICAN: 72 ML/MIN/1.73M2
SODIUM SERPL-SCNC: 137 MMOL/L (ref 135–146)
TRIGL SERPL-MCNC: 59 MG/DL
TSH SERPL-ACNC: 0.94 MIU/L (ref 0.4–4.5)
VIT A SERPL-MCNC: 46 MCG/DL (ref 38–98)
VIT B12 SERPL-MCNC: 1265 PG/ML (ref 200–1100)
WBC # BLD AUTO: 4.6 THOUSAND/UL (ref 3.8–10.8)

## 2018-08-01 ENCOUNTER — OFFICE VISIT (OUTPATIENT)
Dept: FAMILY MEDICINE CLINIC | Facility: CLINIC | Age: 83
End: 2018-08-01
Payer: COMMERCIAL

## 2018-08-01 VITALS
WEIGHT: 123 LBS | SYSTOLIC BLOOD PRESSURE: 140 MMHG | BODY MASS INDEX: 25.82 KG/M2 | DIASTOLIC BLOOD PRESSURE: 80 MMHG | HEIGHT: 58 IN

## 2018-08-01 DIAGNOSIS — M25.552 BILATERAL HIP PAIN: ICD-10-CM

## 2018-08-01 DIAGNOSIS — M81.0 AGE-RELATED OSTEOPOROSIS WITHOUT CURRENT PATHOLOGICAL FRACTURE: ICD-10-CM

## 2018-08-01 DIAGNOSIS — M25.551 BILATERAL HIP PAIN: ICD-10-CM

## 2018-08-01 DIAGNOSIS — L91.8 SKIN TAG: ICD-10-CM

## 2018-08-01 DIAGNOSIS — E78.2 MIXED HYPERLIPIDEMIA: ICD-10-CM

## 2018-08-01 DIAGNOSIS — I10 ESSENTIAL HYPERTENSION: Primary | ICD-10-CM

## 2018-08-01 DIAGNOSIS — M15.9 PRIMARY OSTEOARTHRITIS INVOLVING MULTIPLE JOINTS: ICD-10-CM

## 2018-08-01 DIAGNOSIS — F41.9 ANXIETY: ICD-10-CM

## 2018-08-01 PROCEDURE — 99214 OFFICE O/P EST MOD 30 MIN: CPT | Performed by: FAMILY MEDICINE

## 2018-08-01 RX ORDER — AMLODIPINE BESYLATE AND BENAZEPRIL HYDROCHLORIDE 10; 20 MG/1; MG/1
1 CAPSULE ORAL DAILY
COMMUNITY
Start: 2013-01-30 | End: 2018-10-08 | Stop reason: SDUPTHER

## 2018-08-01 RX ORDER — SIMVASTATIN 40 MG
TABLET ORAL
COMMUNITY
Start: 2018-05-09 | End: 2018-10-08 | Stop reason: SDUPTHER

## 2018-08-01 RX ORDER — MELOXICAM 7.5 MG/1
TABLET ORAL
Qty: 30 TABLET | Refills: 2 | Status: SHIPPED | OUTPATIENT
Start: 2018-08-01 | End: 2018-09-18

## 2018-08-01 RX ORDER — MELOXICAM 7.5 MG/1
7.5 TABLET ORAL DAILY
Qty: 30 TABLET | Refills: 2 | Status: SHIPPED | OUTPATIENT
Start: 2018-08-01 | End: 2018-08-01 | Stop reason: SDUPTHER

## 2018-08-01 RX ORDER — BIMATOPROST 0.01 %
DROPS OPHTHALMIC (EYE)
COMMUNITY
Start: 2018-07-05

## 2018-08-01 NOTE — ASSESSMENT & PLAN NOTE
This is the most worrisome thing to the patient at this time  Tylenol is no longer helping  Starting to affect her daily activities

## 2018-08-01 NOTE — PROGRESS NOTES
Assessment/Plan:    Essential hypertension  Stable on current medications  Continue hydralazine, amlodipine-benazepril, and metoprolol  Osteoarthritis  Hips are bother her quite a bit, will put started on meloxicam and check x-rays  Osteoporosis  Check vitamin-D with next lab work  Mixed hyperlipidemia  Favorable cholesterol profile on simvastatin  Anxiety  Fairly stable on lorazepam daily  Has been doing this for more than 20 years  Bilateral hip pain  This is the most worrisome thing to the patient at this time  Tylenol is no longer helping  Starting to affect her daily activities  Diagnoses and all orders for this visit:    Essential hypertension    Primary osteoarthritis involving multiple joints  -     meloxicam (MOBIC) 7 5 mg tablet; Take 1 tablet (7 5 mg total) by mouth daily    Age-related osteoporosis without current pathological fracture    Anxiety    Mixed hyperlipidemia    Bilateral hip pain  -     XR hip/pelv 2-3 vws left if performed; Future  -     XR hip/pelv 2-3 vws right if performed; Future  -     meloxicam (MOBIC) 7 5 mg tablet; Take 1 tablet (7 5 mg total) by mouth daily    Other orders  -     amLODIPine-benazepril (LOTREL) 10-20 MG per capsule; Take 1 capsule by mouth daily  -     aspirin 81 MG tablet; Take 1 tablet by mouth daily  -     LUMIGAN 0 01 % ophthalmic drops;   -     simvastatin (ZOCOR) 40 mg tablet;           Subjective:   Chief Complaint   Patient presents with    Hypertension    Hyperlipidemia    Anxiety          Patient ID: Aniket Liz is a 82 y o  female  Patient is an 79-year-old female presenting to the office for follow-up on her hypertension, hyperlipidemia, anxiety, and general health  Biggest complaint today is bilateral hip pain  Is gotten progressively worse over the last several months  She has been taking Tylenol as needed for her general joint pain, but is no longer helping with the hips    She is also complaining of a lump in her left groin that keeps reforming  It bleeds occasionally  States it started off as an ingrown hair  The following portions of the patient's history were reviewed and updated as appropriate: allergies, current medications, past family history, past medical history, past social history, past surgical history and problem list     Review of Systems   Constitutional: Negative for activity change, chills, diaphoresis, fatigue, fever and unexpected weight change  HENT: Negative for congestion, ear pain, hearing loss, nosebleeds, postnasal drip, rhinorrhea, sinus pressure, sore throat and tinnitus  Eyes: Negative for photophobia, pain, discharge, redness and visual disturbance  Respiratory: Negative for cough, chest tightness, shortness of breath and wheezing  Cardiovascular: Negative for chest pain, palpitations and leg swelling  Denies RAMIREZ   Gastrointestinal: Negative for abdominal pain, blood in stool, constipation, diarrhea, nausea and vomiting  Endocrine: Negative  Genitourinary: Negative for difficulty urinating, dysuria, frequency, hematuria and urgency  Denies dysmenorrhea, menstrual difficulties   Musculoskeletal: Positive for arthralgias, gait problem and joint swelling  Negative for myalgias  Skin: Negative for rash and wound  Skin lesion of the left groin   Allergic/Immunologic: Negative for environmental allergies, food allergies and immunocompromised state  Neurological: Negative for dizziness, tremors, seizures, syncope, weakness, numbness and headaches  Hematological: Negative  Psychiatric/Behavioral: Positive for decreased concentration  Negative for behavioral problems, confusion, dysphoric mood and sleep disturbance  The patient is nervous/anxious  Objective:      /80   Ht 4' 10" (1 473 m)   Wt 55 8 kg (123 lb)   BMI 25 71 kg/m²          Physical Exam   Constitutional: She is oriented to person, place, and time   She appears well-developed and well-nourished  No distress  HENT:   Head: Normocephalic and atraumatic  Right Ear: External ear normal    Left Ear: External ear normal    Nose: Nose normal    Mouth/Throat: Oropharynx is clear and moist    Eyes: Conjunctivae and EOM are normal  Pupils are equal, round, and reactive to light  Neck: Normal range of motion  Neck supple  No JVD present  No tracheal deviation present  No thyromegaly present  Cardiovascular: Normal rate, regular rhythm and normal heart sounds  No murmur heard  Pulmonary/Chest: Effort normal and breath sounds normal  She has no wheezes  She has no rales  Abdominal: Soft  Bowel sounds are normal  She exhibits no mass  There is no tenderness  There is no rebound and no guarding  Musculoskeletal:        Right hip: She exhibits decreased range of motion, tenderness and bony tenderness  She exhibits no crepitus  Left hip: She exhibits decreased range of motion, tenderness and bony tenderness  She exhibits no crepitus  Lumbar back: She exhibits tenderness  Lymphadenopathy:     She has no cervical adenopathy  Neurological: She is alert and oriented to person, place, and time  She has normal reflexes  No cranial nerve deficit  Skin: Skin is warm and dry  No lesion and no rash noted  Left inguinal area with a 5 x 3 by 2 mm skin tag  It is somewhat irritated  Psychiatric: Her mood appears anxious  Her affect is not labile and not inappropriate  Her speech is tangential  She is agitated  Thought content is not paranoid and not delusional  Cognition and memory are impaired  She expresses impulsivity  Nursing note and vitals reviewed

## 2018-08-02 NOTE — PATIENT INSTRUCTIONS
Return to the office in 1 month for recheck on her hips to see how she is doing with the medication, to review the x-rays  Will also remove her skin tag at that time

## 2018-08-06 ENCOUNTER — APPOINTMENT (OUTPATIENT)
Dept: RADIOLOGY | Facility: MEDICAL CENTER | Age: 83
End: 2018-08-06
Payer: COMMERCIAL

## 2018-08-06 DIAGNOSIS — M25.552 BILATERAL HIP PAIN: ICD-10-CM

## 2018-08-06 DIAGNOSIS — M25.551 BILATERAL HIP PAIN: ICD-10-CM

## 2018-08-06 PROCEDURE — 73502 X-RAY EXAM HIP UNI 2-3 VIEWS: CPT

## 2018-08-09 DIAGNOSIS — F41.1 GENERALIZED ANXIETY DISORDER: ICD-10-CM

## 2018-08-09 RX ORDER — LORAZEPAM 0.5 MG/1
TABLET ORAL
Qty: 60 TABLET | Refills: 1 | Status: SHIPPED | OUTPATIENT
Start: 2018-08-09 | End: 2018-09-10 | Stop reason: SDUPTHER

## 2018-08-13 ENCOUNTER — TELEPHONE (OUTPATIENT)
Dept: FAMILY MEDICINE CLINIC | Facility: CLINIC | Age: 83
End: 2018-08-13

## 2018-08-13 DIAGNOSIS — M25.551 BILATERAL HIP PAIN: Primary | ICD-10-CM

## 2018-08-13 DIAGNOSIS — M25.552 BILATERAL HIP PAIN: Primary | ICD-10-CM

## 2018-08-16 DIAGNOSIS — I10 ESSENTIAL HYPERTENSION: ICD-10-CM

## 2018-08-16 RX ORDER — METOPROLOL SUCCINATE 50 MG/1
TABLET, EXTENDED RELEASE ORAL
Qty: 90 TABLET | Refills: 1 | Status: SHIPPED | OUTPATIENT
Start: 2018-08-16 | End: 2018-10-15 | Stop reason: SDUPTHER

## 2018-08-28 ENCOUNTER — APPOINTMENT (OUTPATIENT)
Dept: RADIOLOGY | Facility: CLINIC | Age: 83
End: 2018-08-28
Payer: COMMERCIAL

## 2018-08-28 ENCOUNTER — APPOINTMENT (OUTPATIENT)
Dept: LAB | Facility: MEDICAL CENTER | Age: 83
End: 2018-08-28
Payer: COMMERCIAL

## 2018-08-28 ENCOUNTER — OFFICE VISIT (OUTPATIENT)
Dept: OBGYN CLINIC | Facility: MEDICAL CENTER | Age: 83
End: 2018-08-28
Payer: COMMERCIAL

## 2018-08-28 VITALS
BODY MASS INDEX: 25.98 KG/M2 | HEIGHT: 58 IN | DIASTOLIC BLOOD PRESSURE: 78 MMHG | HEART RATE: 60 BPM | WEIGHT: 123.8 LBS | SYSTOLIC BLOOD PRESSURE: 185 MMHG

## 2018-08-28 DIAGNOSIS — M54.16 RADICULOPATHY, LUMBAR REGION: ICD-10-CM

## 2018-08-28 DIAGNOSIS — E78.5 HYPERLIPIDEMIA: ICD-10-CM

## 2018-08-28 DIAGNOSIS — M81.0 AGE-RELATED OSTEOPOROSIS WITHOUT CURRENT PATHOLOGICAL FRACTURE: ICD-10-CM

## 2018-08-28 DIAGNOSIS — M16.11 PRIMARY OSTEOARTHRITIS OF ONE HIP, RIGHT: ICD-10-CM

## 2018-08-28 DIAGNOSIS — M25.552 BILATERAL HIP PAIN: ICD-10-CM

## 2018-08-28 DIAGNOSIS — M25.551 BILATERAL HIP PAIN: ICD-10-CM

## 2018-08-28 DIAGNOSIS — L65.9 NONSCARRING HAIR LOSS: ICD-10-CM

## 2018-08-28 DIAGNOSIS — I10 ESSENTIAL (PRIMARY) HYPERTENSION: ICD-10-CM

## 2018-08-28 DIAGNOSIS — M16.12 PRIMARY OSTEOARTHRITIS OF ONE HIP, LEFT: ICD-10-CM

## 2018-08-28 DIAGNOSIS — M54.5 LOW BACK PAIN, UNSPECIFIED BACK PAIN LATERALITY, UNSPECIFIED CHRONICITY, WITH SCIATICA PRESENCE UNSPECIFIED: ICD-10-CM

## 2018-08-28 DIAGNOSIS — M54.5 LOW BACK PAIN, UNSPECIFIED BACK PAIN LATERALITY, UNSPECIFIED CHRONICITY, WITH SCIATICA PRESENCE UNSPECIFIED: Primary | ICD-10-CM

## 2018-08-28 PROCEDURE — 73521 X-RAY EXAM HIPS BI 2 VIEWS: CPT

## 2018-08-28 PROCEDURE — 72100 X-RAY EXAM L-S SPINE 2/3 VWS: CPT

## 2018-08-28 PROCEDURE — 99204 OFFICE O/P NEW MOD 45 MIN: CPT | Performed by: ORTHOPAEDIC SURGERY

## 2018-08-28 NOTE — PROGRESS NOTES
Assessment/Plan     1  Low back pain, unspecified back pain laterality, unspecified chronicity, with sciatica presence unspecified    2  Bilateral hip pain    3  Radiculopathy, lumbar region    4  Primary osteoarthritis of one hip, right    5  Primary osteoarthritis of one hip, left      Orders Placed This Encounter   Procedures    XR hips bilateral 2 vw w pelvis if performed    XR spine lumbar 2 or 3 views injury    MRI lumbar spine wo contrast    C-reactive protein    STEPHAN Screen w/ Reflex to Titer/Pattern    Cyclic citrul peptide antibody, IgG    RF Screen w/ Reflex to Titer    Sedimentation rate, automated    Ambulatory referral to Physical Therapy    Ambulatory referral to Pain Management     Obtain MRI lumbar spine  PT  Continue tylenol prn pain, avoid NSAIDs secondary to GI upset  Obtain bloodwork    Return for f/u with Dr Jackie Luong after MRI, follow up with me in 6 weeks  I answered all of the patient's questions during the visit and provided education of the patient's condition during the visit  The patient verbalized understanding of the information given and agrees with the plan  This note was dictated using [employer] software  It may contain errors including improperly dictated words  Please contact physician directly for any questions  History of Present Illness   Chief complaint:   Chief Complaint   Patient presents with    Left Hip - Pain    Right Hip - Pain       HPI: Tiffanie Vincent is a 82 y o  female that c/o bilateral hip pain her right is worse than her left  She states her pain is in her groin and radiates all the way down her legs or ankles  She states her right hip is worse than her left  Pain started a couple weeks ago  She had a fall last year but no new falls traumas or injuries  She thinks she gets intermittent swelling in her legs  Her pain occurs with walking     She has not done any physical therapy but has been going to the [facility] to do exercises both her legs as well 3 times a week  She also notes a clicking sensation in her right hip  She takes Tylenol as needed for pain control  She tried meloxicam but it upset her stomach  She has had no injections or surgeries on her hips  She does complain of lower back pain  She feels that her gait is abnormal and feels unsteady at times  She uses a cane when needed  She denies any recent bowel or bladder changes  She denies saddle anesthesia  She also admits having generalized intermittent pain and that she has arthritis in multiple joints  Her son recently had a hip replacement  Pain worsens with walking  She admits occasional numbness and tingling in her legs but has difficulty localizing this when asked  ROS:    See HPI for musculoskeletal review  All other systems reviewed are negative     Historical Information   Past Medical History:   Diagnosis Date    Ataxia     last assessed 2/16/2017    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    Murmur     last assessed 5/22/2012    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    Right carotid bruit     last assessed 5/22/2012     Past Surgical History:   Procedure Laterality Date    COLONOSCOPY  2007    TONSILLECTOMY AND ADENOIDECTOMY      TOTAL ABDOMINAL HYSTERECTOMY       Social History   History   Alcohol Use No     Comment: history     History   Drug Use No     History   Smoking Status    Never Smoker   Smokeless Tobacco    Never Used     Family History:   Family History   Problem Relation Age of Onset    No Known Problems Mother     Other Family         CABG    Hypertension Family     Transient ischemic attack Family        Meds/Allergies     (Not in a hospital admission)  Allergies   Allergen Reactions    Hydrocodone-Acetaminophen Dizziness and Fatigue       Objective   Vitals: Blood pressure (!) 185/78, pulse 60, height 4' 10" (1 473 m), weight 56 2 kg (123 lb 12 8 oz)  ,Body mass index is 25 87 kg/m²      PE:  AAOx 3  WDWN  Hearing intact, no drainage from eyes  Regular rate  no audible wheezing  no abdominal distension  LE compartments soft, skin intact    bilateral hip:   No dislocation/deformity  ROM: full, pain with bilateral hip ROM  + TTP over greater trochanter  + TTP over SIJ  + Pam test  Neg   Tom's test  Abduction 5/5    RLE:  EHL/AT/GS/quads/hamstrings/iliopsoas 5/5, sensation grossly intact L4, L5, S1, palpable pedal pulse  LLE:  EHL/AT/GS/quads/hamstrings/iliopsoas 5/5, sensation grossly intact L4, L5, S1, palpable pedal pulse    Back:    No TTP over lumbar spinous processes, paraspinal musculature, lumbar curvature noted  + B/L SLR  B/L patellar reflexes 2/4  Negative clonus    Imaging Studies: I have personally reviewed pertinent films in PACS   X-ray left hip:  Mild to moderate DJD  X-ray right hip:  Mild to moderate DJD  X-ray lumbar spine:  Multilevel degenerative changes with curvature noted, L5-S1 spondylolisthesis

## 2018-09-10 DIAGNOSIS — F41.1 GENERALIZED ANXIETY DISORDER: ICD-10-CM

## 2018-09-10 RX ORDER — LORAZEPAM 0.5 MG/1
0.5 TABLET ORAL 2 TIMES DAILY PRN
Qty: 60 TABLET | Refills: 2 | Status: SHIPPED | OUTPATIENT
Start: 2018-09-10 | End: 2018-10-08 | Stop reason: SDUPTHER

## 2018-09-11 ENCOUNTER — OFFICE VISIT (OUTPATIENT)
Dept: FAMILY MEDICINE CLINIC | Facility: CLINIC | Age: 83
End: 2018-09-11
Payer: COMMERCIAL

## 2018-09-11 VITALS
HEIGHT: 58 IN | BODY MASS INDEX: 25.4 KG/M2 | DIASTOLIC BLOOD PRESSURE: 78 MMHG | WEIGHT: 121 LBS | SYSTOLIC BLOOD PRESSURE: 130 MMHG

## 2018-09-11 DIAGNOSIS — L91.8 SKIN TAG: Primary | ICD-10-CM

## 2018-09-11 PROCEDURE — 88305 TISSUE EXAM BY PATHOLOGIST: CPT | Performed by: PATHOLOGY

## 2018-09-11 PROCEDURE — 11200 RMVL SKIN TAGS UP TO&INC 15: CPT | Performed by: FAMILY MEDICINE

## 2018-09-11 NOTE — PROGRESS NOTES
Assessment/Plan:         Diagnoses and all orders for this visit:    Skin tag          Subjective:   Chief Complaint   Patient presents with    Follow-up     remove skin tag           Patient ID: Rubi Friedman is a 82 y o  female  Patient is an 59-year-old female presenting to the office to have a skin tag removed from her left groin  She showed it to me last visit, it is getting caught in her clothing in getting larger  The following portions of the patient's history were reviewed and updated as appropriate: allergies, current medications, past medical history, past social history and problem list     Review of Systems   Constitutional: Negative  HENT: Negative  Eyes: Negative  Respiratory: Negative  Cardiovascular: Negative  Gastrointestinal: Negative  Genitourinary: Negative  Skin:        Multiple small skin tags but 1 large 1 in the left groin  Allergic/Immunologic: Negative for immunocompromised state  Neurological: Negative for dizziness, tremors, weakness, numbness and headaches  Hematological: Negative for adenopathy  Does not bruise/bleed easily  Psychiatric/Behavioral: Negative  Objective:      /78   Ht 4' 10" (1 473 m)   Wt 54 9 kg (121 lb)   BMI 25 29 kg/m²          Physical Exam   Constitutional: She appears well-developed and well-nourished  HENT:   Head: Normocephalic and atraumatic  Mouth/Throat: No oropharyngeal exudate  Eyes: Conjunctivae and EOM are normal  Pupils are equal, round, and reactive to light  Neck: Neck supple  Cardiovascular: Regular rhythm, normal heart sounds and intact distal pulses  No murmur heard  Pulmonary/Chest: Effort normal and breath sounds normal    Abdominal: Soft  Bowel sounds are normal    Neurological: She is alert  Skin:   Left inguinal area with a 4 x 2 x 2 mm polypoid skin tag       Skin tag removal  Date/Time: 9/11/2018 11:35 AM  Performed by: Latrice Burnett by: Shen Ely Lesion 6:      Patient was placed in the supine position  Area was sterilely prepped, and injected with 1% lidocaine with epinephrine  Areas re-prepped with povidone   Then using appear forceps and a pair of iris scissors, the large skin tag was excised at the level is skin  There was no bleeding  Sterile dressing was applied  Patient tolerated well  Specimen was sent to pathology

## 2018-09-13 ENCOUNTER — TELEPHONE (OUTPATIENT)
Dept: FAMILY MEDICINE CLINIC | Facility: CLINIC | Age: 83
End: 2018-09-13

## 2018-09-13 NOTE — TELEPHONE ENCOUNTER
Looking through my notes, I never ordered a B12 on her  You can use B12 deficiency see if that goes through

## 2018-09-18 ENCOUNTER — CONSULT (OUTPATIENT)
Dept: PAIN MEDICINE | Facility: MEDICAL CENTER | Age: 83
End: 2018-09-18
Payer: COMMERCIAL

## 2018-09-18 VITALS
BODY MASS INDEX: 25.44 KG/M2 | RESPIRATION RATE: 12 BRPM | HEART RATE: 68 BPM | HEIGHT: 58 IN | SYSTOLIC BLOOD PRESSURE: 142 MMHG | DIASTOLIC BLOOD PRESSURE: 70 MMHG | WEIGHT: 121.2 LBS

## 2018-09-18 DIAGNOSIS — M25.552 BILATERAL HIP PAIN: Primary | ICD-10-CM

## 2018-09-18 DIAGNOSIS — G89.29 CHRONIC BILATERAL LOW BACK PAIN WITHOUT SCIATICA: ICD-10-CM

## 2018-09-18 DIAGNOSIS — M25.551 BILATERAL HIP PAIN: Primary | ICD-10-CM

## 2018-09-18 DIAGNOSIS — M54.50 CHRONIC BILATERAL LOW BACK PAIN WITHOUT SCIATICA: ICD-10-CM

## 2018-09-18 PROCEDURE — 99204 OFFICE O/P NEW MOD 45 MIN: CPT | Performed by: PHYSICAL MEDICINE & REHABILITATION

## 2018-09-18 RX ORDER — BIOTIN 1 MG
400 TABLET ORAL
COMMUNITY

## 2018-09-18 NOTE — LETTER
September 18, 2018     Krysta Denson DO  [address]    Patient: Reilly Calle   YOB: 1935   Date of Visit: 9/18/2018       Dear Dr Church Nap: Thank you for referring Landon Post to me for evaluation  Below are my notes for this consultation  If you have questions, please do not hesitate to call me  I look forward to following your patient along with you  Sincerely,        Lia Albright,         CC: DO Lia Wilkins,   9/18/2018  2:37 PM  Sign at close encounter  Assessment:  1  Bilateral hip pain    2  Chronic bilateral low back pain without sciatica        Plan:   1  We will schedule patient for a right hip joint injection under fluoroscopic guidance  Complete risks and benefits including bleeding, infection, tissue reaction, allergic reaction were discussed  Verbal consent obtained  2   The patient will obtain MRI of the lumbar spine  3   Follow-up after MRI to evaluate for potential interventional spine options    My impressions and treatment recommendations were discussed in detail with the patient who verbalized understanding and had no further questions  Discharge instructions were provided  I personally saw and examined the patient and I agree with the above discussed plan of care  Orders Placed This Encounter   Procedures    FL spine and pain procedure     Standing Status:   Future     Standing Expiration Date:   9/18/2019     Order Specific Question:   Reason for Exam:     Answer:   (R) hip injection     Order Specific Question:   Anticoagulant hold needed?      Answer:   no     New Medications Ordered This Visit   Medications    Multiple Vitamins-Minerals (WOMENS 50+ MULTI VITAMIN/MIN PO)     Sig: Take by mouth    Calcium-Magnesium 333-167 MG TABS     Sig: Take by mouth    Cholecalciferol (VITAMIN D3) 1000 units CAPS     Sig: Take by mouth       History of Present Illness:    Reilly Calle is a 82 y o  female  Sent from Dr Charmayne Rising for further evaluation and management of her pain complaints  She is complaining of some lower lumbar axial pain as well as bilateral hip pain as well as right-sided hip clicking with walking  She is describing moderate severity symptoms rated as a 5/10 which is intermittent without any typical pattern  She characterizes the pain as dull, aching, pressure-like, numbness  Aggravating factors include standing, bending, walking, and exercise  She is unable to determine any significant alleviating factors  Diagnostic studies include x-rays of the hips as well as lumbar spine  She does have some significant scoliosis  She also has some degenerative changes in the hip joints  She has tried some exercise with moderate relief but is seeking further improvement  She is using acetaminophen 325 mg twice daily with some improvement in pain  I have personally reviewed and/or updated the patient's past medical history, past surgical history, family history, social history, current medications, allergies, and vital signs today  Review of Systems:    Review of Systems   Constitutional: Negative for fever and unexpected weight change  HENT: Negative for trouble swallowing  Eyes: Negative for visual disturbance  Respiratory: Negative for shortness of breath and wheezing  Cardiovascular: Positive for palpitations and leg swelling  Negative for chest pain  Gastrointestinal: Positive for constipation  Negative for diarrhea, nausea and vomiting  Endocrine: Negative for cold intolerance, heat intolerance and polydipsia  Genitourinary: Negative for difficulty urinating and frequency  Musculoskeletal: Positive for back pain, gait problem and joint swelling  Negative for arthralgias and myalgias  Skin: Negative for rash  Neurological: Positive for dizziness and weakness  Negative for seizures, syncope and headaches     Hematological: Bruises/bleeds easily  Psychiatric/Behavioral: Positive for dysphoric mood  All other systems reviewed and are negative  Patient Active Problem List   Diagnosis    Osteoarthritis    Osteoporosis    Mixed hyperlipidemia    Essential hypertension    Anxiety    Bilateral hip pain    Skin tag       Past Medical History:   Diagnosis Date    Ataxia     last assessed 2/16/2017    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    Murmur     last assessed 5/22/2012    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    Right carotid bruit     last assessed 5/22/2012       Past Surgical History:   Procedure Laterality Date    COLONOSCOPY  2007    TONSILLECTOMY AND ADENOIDECTOMY      TOTAL ABDOMINAL HYSTERECTOMY         Family History   Problem Relation Age of Onset    No Known Problems Mother     Other Family         CABG    Hypertension Family     Transient ischemic attack Family        Social History     Occupational History    Not on file  Social History Main Topics    Smoking status: Never Smoker    Smokeless tobacco: Never Used    Alcohol use No      Comment: history    Drug use: No    Sexual activity: Not on file       Current Outpatient Prescriptions on File Prior to Visit   Medication Sig    amLODIPine-benazepril (LOTREL) 10-20 MG per capsule Take 1 capsule by mouth daily    aspirin 81 MG tablet Take 1 tablet by mouth daily    hydrALAZINE (APRESOLINE) 25 mg tablet TAKE 1 TABLET BY MOUTH TWICE DAILY    LORazepam (ATIVAN) 0 5 mg tablet Take 1 tablet (0 5 mg total) by mouth 2 (two) times a day as needed for anxiety    LUMIGAN 0 01 % ophthalmic drops     metoprolol succinate (TOPROL-XL) 50 mg 24 hr tablet TAKE 1 TABLET BY MOUTH THREE TIMES DAILY    simvastatin (ZOCOR) 40 mg tablet     [DISCONTINUED] meloxicam (MOBIC) 7 5 mg tablet TAKE 1 TABLET(7 5 MG) BY MOUTH DAILY     No current facility-administered medications on file prior to visit          Allergies   Allergen Reactions    Hydrocodone-Acetaminophen Dizziness and Fatigue       Physical Exam:    /70   Pulse 68   Resp 12   Ht 4' 10" (1 473 m)   Wt 55 kg (121 lb 3 2 oz)   BMI 25 33 kg/m²        LUMBAR  General: Well-developed, well-nourished individual in no acute distress  Mental: Appropriate mood and affect  Grossly oriented with coherent speech and thought processing   Neuro:  Cranial nerves: Cranial nerve function is grossly intact bilaterally   Strength: Bilateral lower extremity strength is normal and symmetric   No atrophy or tone abnormalities noted   Reflexes: Bilateral lower extremity muscle stretch reflexes are physiologic and symmetric   No ankle clonus is noted   Sensation: No loss of sensation is noted   SLR/Foraminal Compression Maneuvers: Straight leg raising in the sitting position is negative for radicular pain   Gait:  Gait/gross motor: Gait is normal  Station is normal  Toe walking, heel walking  are normal     Musculoskeletal:  Palpation: Inspection and palpation of the spine and extremities are unremarkable except for some mild tenderness to palpation over the lower lumbar spine  Spine: Normal pain-free range of motion   No gross axial skeletal deformities   Hips:  Bilateral passive internal range of motion of the hips reproduces hip pain complaint  Unable to reproduce clicking on the right side  Skin: Skin inspection grossly negative for erythema, breakdown, or concerning lesions in affected area   Lymph: No lymphadenopathy is appreciated in the involved extremity   Vessels: No lower extremity edema   Lungs: Breathing is comfortable and regular  No dyspnea noted during examination   Eyes: Visual field grossly intact to confrontation  No redness appreciated  ENT: No craniofacial deformities or asymmetry  No neck masses appreciated  Imaging  LUMBAR SPINE     INDICATION:   M54 5: Low back pain    Patient fell last year and has intermittent lower back pain      COMPARISON:  None     VIEWS:  XR SPINE LUMBAR 2 OR 3 VIEWS INJURY        FINDINGS:     There is no evidence of acute fracture or destructive osseous lesion      There is severe lumbar scoliosis convex left    There is mild anterolisthesis of L5 on S1      Moderate degenerative disc space narrowing at L5-S1      Mild degenerative disc disease throughout the lumbar spine elsewhere      Moderate degenerative facet disease throughout the lumbar spine      The pedicles appear intact      Soft tissues are unremarkable      IMPRESSION:     No acute osseous abnormality      Severe lumbar scoliosis      Degenerative changes as described

## 2018-09-18 NOTE — PROGRESS NOTES
Assessment:  1  Bilateral hip pain    2  Chronic bilateral low back pain without sciatica        Plan:   1  We will schedule patient for a right hip joint injection under fluoroscopic guidance  Complete risks and benefits including bleeding, infection, tissue reaction, allergic reaction were discussed  Verbal consent obtained  2   The patient will obtain MRI of the lumbar spine  3   Follow-up after MRI to evaluate for potential interventional spine options    My impressions and treatment recommendations were discussed in detail with the patient who verbalized understanding and had no further questions  Discharge instructions were provided  I personally saw and examined the patient and I agree with the above discussed plan of care  Orders Placed This Encounter   Procedures    FL spine and pain procedure     Standing Status:   Future     Standing Expiration Date:   9/18/2019     Order Specific Question:   Reason for Exam:     Answer:   (R) hip injection     Order Specific Question:   Anticoagulant hold needed? Answer:   no     New Medications Ordered This Visit   Medications    Multiple Vitamins-Minerals (WOMENS 50+ MULTI VITAMIN/MIN PO)     Sig: Take by mouth    Calcium-Magnesium 333-167 MG TABS     Sig: Take by mouth    Cholecalciferol (VITAMIN D3) 1000 units CAPS     Sig: Take by mouth       History of Present Illness:    Pranay Josue is a 82 y o  female  Sent from Dr Diana Florez for further evaluation and management of her pain complaints  She is complaining of some lower lumbar axial pain as well as bilateral hip pain as well as right-sided hip clicking with walking  She is describing moderate severity symptoms rated as a 5/10 which is intermittent without any typical pattern  She characterizes the pain as dull, aching, pressure-like, numbness  Aggravating factors include standing, bending, walking, and exercise  She is unable to determine any significant alleviating factors  Diagnostic studies include x-rays of the hips as well as lumbar spine  She does have some significant scoliosis  She also has some degenerative changes in the hip joints  She has tried some exercise with moderate relief but is seeking further improvement  She is using acetaminophen 325 mg twice daily with some improvement in pain  I have personally reviewed and/or updated the patient's past medical history, past surgical history, family history, social history, current medications, allergies, and vital signs today  Review of Systems:    Review of Systems   Constitutional: Negative for fever and unexpected weight change  HENT: Negative for trouble swallowing  Eyes: Negative for visual disturbance  Respiratory: Negative for shortness of breath and wheezing  Cardiovascular: Positive for palpitations and leg swelling  Negative for chest pain  Gastrointestinal: Positive for constipation  Negative for diarrhea, nausea and vomiting  Endocrine: Negative for cold intolerance, heat intolerance and polydipsia  Genitourinary: Negative for difficulty urinating and frequency  Musculoskeletal: Positive for back pain, gait problem and joint swelling  Negative for arthralgias and myalgias  Skin: Negative for rash  Neurological: Positive for dizziness and weakness  Negative for seizures, syncope and headaches  Hematological: Bruises/bleeds easily  Psychiatric/Behavioral: Positive for dysphoric mood  All other systems reviewed and are negative        Patient Active Problem List   Diagnosis    Osteoarthritis    Osteoporosis    Mixed hyperlipidemia    Essential hypertension    Anxiety    Bilateral hip pain    Skin tag       Past Medical History:   Diagnosis Date    Ataxia     last assessed 2/16/2017    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    Murmur     last assessed 5/22/2012    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    Right carotid bruit     last assessed 5/22/2012       Past Surgical History:   Procedure Laterality Date    COLONOSCOPY  2007    TONSILLECTOMY AND ADENOIDECTOMY      TOTAL ABDOMINAL HYSTERECTOMY         Family History   Problem Relation Age of Onset    No Known Problems Mother     Other Family         CABG    Hypertension Family     Transient ischemic attack Family        Social History     Occupational History    Not on file  Social History Main Topics    Smoking status: Never Smoker    Smokeless tobacco: Never Used    Alcohol use No      Comment: history    Drug use: No    Sexual activity: Not on file       Current Outpatient Prescriptions on File Prior to Visit   Medication Sig    amLODIPine-benazepril (LOTREL) 10-20 MG per capsule Take 1 capsule by mouth daily    aspirin 81 MG tablet Take 1 tablet by mouth daily    hydrALAZINE (APRESOLINE) 25 mg tablet TAKE 1 TABLET BY MOUTH TWICE DAILY    LORazepam (ATIVAN) 0 5 mg tablet Take 1 tablet (0 5 mg total) by mouth 2 (two) times a day as needed for anxiety    LUMIGAN 0 01 % ophthalmic drops     metoprolol succinate (TOPROL-XL) 50 mg 24 hr tablet TAKE 1 TABLET BY MOUTH THREE TIMES DAILY    simvastatin (ZOCOR) 40 mg tablet     [DISCONTINUED] meloxicam (MOBIC) 7 5 mg tablet TAKE 1 TABLET(7 5 MG) BY MOUTH DAILY     No current facility-administered medications on file prior to visit  Allergies   Allergen Reactions    Hydrocodone-Acetaminophen Dizziness and Fatigue       Physical Exam:    /70   Pulse 68   Resp 12   Ht 4' 10" (1 473 m)   Wt 55 kg (121 lb 3 2 oz)   BMI 25 33 kg/m²       LUMBAR  General: Well-developed, well-nourished individual in no acute distress  Mental: Appropriate mood and affect  Grossly oriented with coherent speech and thought processing   Neuro:  Cranial nerves: Cranial nerve function is grossly intact bilaterally   Strength: Bilateral lower extremity strength is normal and symmetric   No atrophy or tone abnormalities noted     Reflexes: Bilateral lower extremity muscle stretch reflexes are physiologic and symmetric   No ankle clonus is noted   Sensation: No loss of sensation is noted   SLR/Foraminal Compression Maneuvers: Straight leg raising in the sitting position is negative for radicular pain   Gait:  Gait/gross motor: Gait is normal  Station is normal  Toe walking, heel walking  are normal     Musculoskeletal:  Palpation: Inspection and palpation of the spine and extremities are unremarkable except for some mild tenderness to palpation over the lower lumbar spine  Spine: Normal pain-free range of motion   No gross axial skeletal deformities   Hips:  Bilateral passive internal range of motion of the hips reproduces hip pain complaint  Unable to reproduce clicking on the right side  Skin: Skin inspection grossly negative for erythema, breakdown, or concerning lesions in affected area   Lymph: No lymphadenopathy is appreciated in the involved extremity   Vessels: No lower extremity edema   Lungs: Breathing is comfortable and regular  No dyspnea noted during examination   Eyes: Visual field grossly intact to confrontation  No redness appreciated  ENT: No craniofacial deformities or asymmetry  No neck masses appreciated  Imaging  LUMBAR SPINE     INDICATION:   M54 5: Low back pain  Patient fell last year and has intermittent lower back pain      COMPARISON:  None     VIEWS:  XR SPINE LUMBAR 2 OR 3 VIEWS INJURY        FINDINGS:     There is no evidence of acute fracture or destructive osseous lesion      There is severe lumbar scoliosis convex left    There is mild anterolisthesis of L5 on S1      Moderate degenerative disc space narrowing at L5-S1      Mild degenerative disc disease throughout the lumbar spine elsewhere      Moderate degenerative facet disease throughout the lumbar spine      The pedicles appear intact      Soft tissues are unremarkable      IMPRESSION:     No acute osseous abnormality      Severe lumbar scoliosis      Degenerative changes as described

## 2018-10-01 ENCOUNTER — HOSPITAL ENCOUNTER (OUTPATIENT)
Dept: MRI IMAGING | Facility: HOSPITAL | Age: 83
Discharge: HOME | End: 2018-10-01
Attending: ORTHOPAEDIC SURGERY
Payer: COMMERCIAL

## 2018-10-01 DIAGNOSIS — M54.16 RADICULOPATHY, LUMBAR REGION: ICD-10-CM

## 2018-10-01 DIAGNOSIS — M16.11 PRIMARY OSTEOARTHRITIS OF ONE HIP, RIGHT: ICD-10-CM

## 2018-10-01 DIAGNOSIS — M25.551 BILATERAL HIP PAIN: ICD-10-CM

## 2018-10-01 DIAGNOSIS — M54.5 LOW BACK PAIN, UNSPECIFIED BACK PAIN LATERALITY, UNSPECIFIED CHRONICITY, WITH SCIATICA PRESENCE UNSPECIFIED: ICD-10-CM

## 2018-10-01 DIAGNOSIS — M16.12 PRIMARY OSTEOARTHRITIS OF ONE HIP, LEFT: ICD-10-CM

## 2018-10-01 DIAGNOSIS — M25.552 BILATERAL HIP PAIN: ICD-10-CM

## 2018-10-01 PROCEDURE — 72148 MRI LUMBAR SPINE W/O DYE: CPT

## 2018-10-08 DIAGNOSIS — I10 ESSENTIAL HYPERTENSION: ICD-10-CM

## 2018-10-08 DIAGNOSIS — E78.2 MIXED HYPERLIPIDEMIA: Primary | ICD-10-CM

## 2018-10-08 DIAGNOSIS — F41.1 GENERALIZED ANXIETY DISORDER: ICD-10-CM

## 2018-10-08 RX ORDER — HYDRALAZINE HYDROCHLORIDE 25 MG/1
25 TABLET, FILM COATED ORAL 2 TIMES DAILY
Qty: 90 TABLET | Refills: 0 | Status: SHIPPED | OUTPATIENT
Start: 2018-10-08 | End: 2018-11-27 | Stop reason: SDUPTHER

## 2018-10-08 RX ORDER — LORAZEPAM 0.5 MG/1
0.5 TABLET ORAL 2 TIMES DAILY PRN
Qty: 60 TABLET | Refills: 0 | Status: SHIPPED | OUTPATIENT
Start: 2018-10-08 | End: 2018-11-05 | Stop reason: SDUPTHER

## 2018-10-08 RX ORDER — SIMVASTATIN 40 MG
40 TABLET ORAL
Qty: 90 TABLET | Refills: 0 | Status: SHIPPED | OUTPATIENT
Start: 2018-10-08 | End: 2019-05-06

## 2018-10-08 RX ORDER — AMLODIPINE BESYLATE AND BENAZEPRIL HYDROCHLORIDE 10; 20 MG/1; MG/1
1 CAPSULE ORAL DAILY
Qty: 90 CAPSULE | Refills: 0 | Status: SHIPPED | OUTPATIENT
Start: 2018-10-08 | End: 2019-04-02 | Stop reason: SDUPTHER

## 2018-10-15 DIAGNOSIS — I10 ESSENTIAL HYPERTENSION: ICD-10-CM

## 2018-10-15 RX ORDER — METOPROLOL SUCCINATE 50 MG/1
TABLET, EXTENDED RELEASE ORAL
Qty: 90 TABLET | Refills: 1 | Status: SHIPPED | OUTPATIENT
Start: 2018-10-15 | End: 2018-12-17 | Stop reason: SDUPTHER

## 2018-10-19 ENCOUNTER — TELEPHONE (OUTPATIENT)
Dept: OBGYN CLINIC | Facility: MEDICAL CENTER | Age: 83
End: 2018-10-19

## 2018-10-19 NOTE — TELEPHONE ENCOUNTER
I called patient to review results from MRI as she has not followed up with me or Dr Jemma Hicks  If she is feeling better then she can just monitor for symptoms  If she is still having pain she should follow-up with me or Dr Jemma Hicks

## 2018-10-22 ENCOUNTER — TELEPHONE (OUTPATIENT)
Dept: OBGYN CLINIC | Facility: MEDICAL CENTER | Age: 83
End: 2018-10-22

## 2018-10-22 NOTE — TELEPHONE ENCOUNTER
PT called responding to voicemail left on Friday  I advised Pt she should make a follow-up appointment with Dr Edis Small or Dr Zoë Valdes  Pt said she is in the middle of moving and will call back and make an appt when she is able to come in

## 2018-11-05 DIAGNOSIS — F41.1 GENERALIZED ANXIETY DISORDER: ICD-10-CM

## 2018-11-05 RX ORDER — LORAZEPAM 0.5 MG/1
TABLET ORAL
Qty: 60 TABLET | Refills: 0 | Status: SHIPPED | OUTPATIENT
Start: 2018-11-05 | End: 2018-12-14 | Stop reason: SDUPTHER

## 2018-11-27 DIAGNOSIS — I10 ESSENTIAL HYPERTENSION: ICD-10-CM

## 2018-11-27 RX ORDER — HYDRALAZINE HYDROCHLORIDE 25 MG/1
TABLET, FILM COATED ORAL
Qty: 90 TABLET | Refills: 0 | Status: SHIPPED | OUTPATIENT
Start: 2018-11-27 | End: 2019-01-10 | Stop reason: SDUPTHER

## 2018-12-14 ENCOUNTER — OFFICE VISIT (OUTPATIENT)
Dept: FAMILY MEDICINE CLINIC | Facility: CLINIC | Age: 83
End: 2018-12-14
Payer: COMMERCIAL

## 2018-12-14 VITALS
HEIGHT: 58 IN | TEMPERATURE: 98.4 F | SYSTOLIC BLOOD PRESSURE: 130 MMHG | WEIGHT: 116 LBS | BODY MASS INDEX: 24.35 KG/M2 | DIASTOLIC BLOOD PRESSURE: 78 MMHG

## 2018-12-14 DIAGNOSIS — M25.552 BILATERAL HIP PAIN: ICD-10-CM

## 2018-12-14 DIAGNOSIS — M81.0 AGE-RELATED OSTEOPOROSIS WITHOUT CURRENT PATHOLOGICAL FRACTURE: Primary | ICD-10-CM

## 2018-12-14 DIAGNOSIS — E78.2 MIXED HYPERLIPIDEMIA: ICD-10-CM

## 2018-12-14 DIAGNOSIS — F41.1 GENERALIZED ANXIETY DISORDER: ICD-10-CM

## 2018-12-14 DIAGNOSIS — R42 VERTIGO: ICD-10-CM

## 2018-12-14 DIAGNOSIS — I10 ESSENTIAL HYPERTENSION: ICD-10-CM

## 2018-12-14 DIAGNOSIS — F41.9 ANXIETY: ICD-10-CM

## 2018-12-14 DIAGNOSIS — M25.551 BILATERAL HIP PAIN: ICD-10-CM

## 2018-12-14 DIAGNOSIS — R51.9 ACUTE NONINTRACTABLE HEADACHE, UNSPECIFIED HEADACHE TYPE: ICD-10-CM

## 2018-12-14 LAB
25(OH)D3 SERPL-MCNC: 37.6 NG/ML (ref 30–100)
ALBUMIN SERPL BCP-MCNC: 4.1 G/DL (ref 3.5–5)
ALP SERPL-CCNC: 82 U/L (ref 46–116)
ALT SERPL W P-5'-P-CCNC: 30 U/L (ref 12–78)
ANION GAP SERPL CALCULATED.3IONS-SCNC: 10 MMOL/L (ref 4–13)
AST SERPL W P-5'-P-CCNC: 23 U/L (ref 5–45)
BASOPHILS # BLD AUTO: 0.03 THOUSANDS/ΜL (ref 0–0.1)
BASOPHILS NFR BLD AUTO: 1 % (ref 0–1)
BILIRUB SERPL-MCNC: 0.23 MG/DL (ref 0.2–1)
BUN SERPL-MCNC: 16 MG/DL (ref 5–25)
CALCIUM SERPL-MCNC: 9.9 MG/DL (ref 8.3–10.1)
CHLORIDE SERPL-SCNC: 104 MMOL/L (ref 100–108)
CHOLEST SERPL-MCNC: 163 MG/DL (ref 50–200)
CO2 SERPL-SCNC: 28 MMOL/L (ref 21–32)
CREAT SERPL-MCNC: 0.76 MG/DL (ref 0.6–1.3)
CRP SERPL QL: <3 MG/L
EOSINOPHIL # BLD AUTO: 0.2 THOUSAND/ΜL (ref 0–0.61)
EOSINOPHIL NFR BLD AUTO: 4 % (ref 0–6)
ERYTHROCYTE [DISTWIDTH] IN BLOOD BY AUTOMATED COUNT: 14.1 % (ref 11.6–15.1)
GFR SERPL CREATININE-BSD FRML MDRD: 85 ML/MIN/1.73SQ M
GLUCOSE SERPL-MCNC: 71 MG/DL (ref 65–140)
HCT VFR BLD AUTO: 39.8 % (ref 34.8–46.1)
HDLC SERPL-MCNC: 79 MG/DL (ref 40–60)
HGB BLD-MCNC: 12.8 G/DL (ref 11.5–15.4)
IMM GRANULOCYTES # BLD AUTO: 0.01 THOUSAND/UL (ref 0–0.2)
IMM GRANULOCYTES NFR BLD AUTO: 0 % (ref 0–2)
LDLC SERPL CALC-MCNC: 70 MG/DL (ref 0–100)
LYMPHOCYTES # BLD AUTO: 1.27 THOUSANDS/ΜL (ref 0.6–4.47)
LYMPHOCYTES NFR BLD AUTO: 22 % (ref 14–44)
MCH RBC QN AUTO: 29.3 PG (ref 26.8–34.3)
MCHC RBC AUTO-ENTMCNC: 32.2 G/DL (ref 31.4–37.4)
MCV RBC AUTO: 91 FL (ref 82–98)
MONOCYTES # BLD AUTO: 0.57 THOUSAND/ΜL (ref 0.17–1.22)
MONOCYTES NFR BLD AUTO: 10 % (ref 4–12)
NEUTROPHILS # BLD AUTO: 3.59 THOUSANDS/ΜL (ref 1.85–7.62)
NEUTS SEG NFR BLD AUTO: 63 % (ref 43–75)
NONHDLC SERPL-MCNC: 84 MG/DL
NRBC BLD AUTO-RTO: 0 /100 WBCS
PLATELET # BLD AUTO: 405 THOUSANDS/UL (ref 149–390)
PMV BLD AUTO: 10.2 FL (ref 8.9–12.7)
POTASSIUM SERPL-SCNC: 3.8 MMOL/L (ref 3.5–5.3)
PROT SERPL-MCNC: 8 G/DL (ref 6.4–8.2)
RBC # BLD AUTO: 4.37 MILLION/UL (ref 3.81–5.12)
SODIUM SERPL-SCNC: 142 MMOL/L (ref 136–145)
T4 SERPL-MCNC: 8.8 UG/DL (ref 4.7–13.3)
TRIGL SERPL-MCNC: 68 MG/DL
TSH SERPL DL<=0.05 MIU/L-ACNC: 0.9 UIU/ML (ref 0.36–3.74)
WBC # BLD AUTO: 5.67 THOUSAND/UL (ref 4.31–10.16)

## 2018-12-14 PROCEDURE — 1160F RVW MEDS BY RX/DR IN RCRD: CPT | Performed by: FAMILY MEDICINE

## 2018-12-14 PROCEDURE — 85025 COMPLETE CBC W/AUTO DIFF WBC: CPT | Performed by: FAMILY MEDICINE

## 2018-12-14 PROCEDURE — 84443 ASSAY THYROID STIM HORMONE: CPT | Performed by: FAMILY MEDICINE

## 2018-12-14 PROCEDURE — 1036F TOBACCO NON-USER: CPT | Performed by: FAMILY MEDICINE

## 2018-12-14 PROCEDURE — 99214 OFFICE O/P EST MOD 30 MIN: CPT | Performed by: FAMILY MEDICINE

## 2018-12-14 PROCEDURE — 80053 COMPREHEN METABOLIC PANEL: CPT | Performed by: FAMILY MEDICINE

## 2018-12-14 PROCEDURE — 36415 COLL VENOUS BLD VENIPUNCTURE: CPT | Performed by: FAMILY MEDICINE

## 2018-12-14 PROCEDURE — 84436 ASSAY OF TOTAL THYROXINE: CPT | Performed by: FAMILY MEDICINE

## 2018-12-14 PROCEDURE — 4040F PNEUMOC VAC/ADMIN/RCVD: CPT | Performed by: FAMILY MEDICINE

## 2018-12-14 PROCEDURE — 3075F SYST BP GE 130 - 139MM HG: CPT | Performed by: FAMILY MEDICINE

## 2018-12-14 PROCEDURE — 3078F DIAST BP <80 MM HG: CPT | Performed by: FAMILY MEDICINE

## 2018-12-14 PROCEDURE — 3008F BODY MASS INDEX DOCD: CPT | Performed by: FAMILY MEDICINE

## 2018-12-14 PROCEDURE — 82306 VITAMIN D 25 HYDROXY: CPT | Performed by: FAMILY MEDICINE

## 2018-12-14 PROCEDURE — 80061 LIPID PANEL: CPT | Performed by: FAMILY MEDICINE

## 2018-12-14 PROCEDURE — 86140 C-REACTIVE PROTEIN: CPT | Performed by: FAMILY MEDICINE

## 2018-12-14 RX ORDER — LORAZEPAM 0.5 MG/1
TABLET ORAL
Qty: 60 TABLET | Refills: 0 | Status: SHIPPED | OUTPATIENT
Start: 2018-12-14 | End: 2019-01-10 | Stop reason: SDUPTHER

## 2018-12-14 NOTE — ASSESSMENT & PLAN NOTE
Likely multifactorial   Will check lab and a CT of the head  She is to call if the headaches get worse I will see her in 2 weeks to review everything

## 2018-12-14 NOTE — PROGRESS NOTES
Assessment/Plan:    Acute nonintractable headache  Likely multifactorial   Will check lab and a CT of the head  She is to call if the headaches get worse I will see her in 2 weeks to review everything  Osteoporosis  Due for lab, drawn in the office today, will discuss when she comes in  Bilateral hip pain  Needs to follow up with pain management for injection therapy  Anxiety  Patient had recently started to try to cut back on her lorazepam, taking in only as needed  Historically she is taking it twice a day for almost 30 years  At this point I do not think we are going to be able to adjust the dose down with any success  I told her just to take it twice a day for now  I will see how she is doing in 2 weeks       Diagnoses and all orders for this visit:    Acute nonintractable headache, unspecified headache type  -     CT head wo contrast; Future  -     CBC and differential; Future  -     Comprehensive metabolic panel; Future  -     C-reactive protein; Future    Anxiety  -     TSH, 3rd generation; Future    Essential hypertension    Age-related osteoporosis without current pathological fracture  -     Vitamin D 25 hydroxy; Future    Bilateral hip pain    Mixed hyperlipidemia  -     Lipid panel; Future  -     TSH, 3rd generation; Future    Vertigo  -     TSH, 3rd generation; Future  -     T4; Future          Subjective:   Chief Complaint   Patient presents with    Headache    Tinnitus    Dizziness          Patient ID: Denny Moreno is a 82 y o  female  Patient is an 59-year-old female presenting to the office complaining of headaches, vertigo, denied this, malaise, and anxiety after recently moving into a new apartment  The all department had some unpleasant neighbors she thought this was going to be a good situation for her  She was somewhat stressed with all the packing she had to do    She had he get rid of a significant amount of her belongings as she downsized from a 2 bedroom to 1 bedroom apartment  The new apartment has gas and she is 2nd guessing whether she is comfortable with this  She has not really med her new neighbors yet  Tonight is has resolved, but the headaches are almost daily in nature, and the dizziness comes and goes  Dizziness improves with lorazepam, headaches improve somewhat when she takes her arthritis medication  The following portions of the patient's history were reviewed and updated as appropriate: allergies, current medications, past family history, past medical history, past social history, past surgical history and problem list     Review of Systems   Constitutional: Positive for activity change, appetite change and fatigue  Negative for unexpected weight change  HENT: Negative  Eyes: Negative  Negative for visual disturbance  Respiratory: Negative  Cardiovascular: Negative  Gastrointestinal: Negative  Endocrine: Negative for cold intolerance, heat intolerance, polydipsia, polyphagia and polyuria  Musculoskeletal: Positive for arthralgias (Continues to have hip discomfort, has not followed up yet for possible injection ) and back pain (MRI showed extensive disc disease, scoliosis, and foraminal stenosis  Needs to follow up with pain management  )  Skin: Negative for pallor and rash  Allergic/Immunologic: Negative for immunocompromised state  Neurological: Positive for dizziness, light-headedness and headaches  Negative for tremors, seizures, syncope, facial asymmetry, speech difficulty, weakness and numbness  Hematological: Negative for adenopathy  Does not bruise/bleed easily  Psychiatric/Behavioral: Positive for confusion, decreased concentration and dysphoric mood  Negative for agitation  The patient is nervous/anxious  Objective:      /78   Temp 98 4 °F (36 9 °C)   Ht 4' 10" (1 473 m)   Wt 52 6 kg (116 lb)   BMI 24 24 kg/m²          Physical Exam   Constitutional: She is oriented to person, place, and time   She appears well-developed and well-nourished  No distress  HENT:   Head: Normocephalic and atraumatic  Right Ear: External ear normal    Left Ear: External ear normal    Nose: Nose normal    Mouth/Throat: Oropharynx is clear and moist    Eyes: Pupils are equal, round, and reactive to light  Conjunctivae and EOM are normal    Neck: Normal range of motion  Neck supple  No JVD present  No tracheal deviation present  No thyromegaly present  Cardiovascular: Normal rate, regular rhythm and normal heart sounds  No murmur heard  Pulmonary/Chest: Effort normal and breath sounds normal  She has no wheezes  She has no rales  Abdominal: Soft  Bowel sounds are normal  She exhibits no mass  There is no tenderness  There is no rebound and no guarding  Musculoskeletal: Normal range of motion  She exhibits no edema or tenderness  Lymphadenopathy:     She has no cervical adenopathy  Neurological: She is alert and oriented to person, place, and time  She has normal reflexes  No cranial nerve deficit  Skin: Skin is warm and dry  No lesion and no rash noted  Psychiatric: She has a normal mood and affect  Judgment normal    Nursing note and vitals reviewed

## 2018-12-14 NOTE — ASSESSMENT & PLAN NOTE
Patient had recently started to try to cut back on her lorazepam, taking in only as needed  Historically she is taking it twice a day for almost 30 years  At this point I do not think we are going to be able to adjust the dose down with any success  I told her just to take it twice a day for now    I will see how she is doing in 2 weeks

## 2018-12-17 DIAGNOSIS — I10 ESSENTIAL HYPERTENSION: ICD-10-CM

## 2018-12-17 RX ORDER — METOPROLOL SUCCINATE 50 MG/1
TABLET, EXTENDED RELEASE ORAL
Qty: 90 TABLET | Refills: 1 | Status: SHIPPED | OUTPATIENT
Start: 2018-12-17 | End: 2019-02-04

## 2018-12-28 ENCOUNTER — OFFICE VISIT (OUTPATIENT)
Dept: FAMILY MEDICINE CLINIC | Facility: CLINIC | Age: 83
End: 2018-12-28
Payer: COMMERCIAL

## 2018-12-28 VITALS
BODY MASS INDEX: 24.56 KG/M2 | DIASTOLIC BLOOD PRESSURE: 80 MMHG | SYSTOLIC BLOOD PRESSURE: 118 MMHG | WEIGHT: 117 LBS | HEIGHT: 58 IN

## 2018-12-28 DIAGNOSIS — I35.8 AORTIC HEART MURMUR: ICD-10-CM

## 2018-12-28 DIAGNOSIS — R51.9 ACUTE NONINTRACTABLE HEADACHE, UNSPECIFIED HEADACHE TYPE: ICD-10-CM

## 2018-12-28 DIAGNOSIS — R07.89 ATYPICAL CHEST PAIN: Primary | ICD-10-CM

## 2018-12-28 DIAGNOSIS — R42 VERTIGO: ICD-10-CM

## 2018-12-28 PROCEDURE — 1160F RVW MEDS BY RX/DR IN RCRD: CPT | Performed by: FAMILY MEDICINE

## 2018-12-28 PROCEDURE — 99214 OFFICE O/P EST MOD 30 MIN: CPT | Performed by: FAMILY MEDICINE

## 2018-12-28 PROCEDURE — 93000 ELECTROCARDIOGRAM COMPLETE: CPT | Performed by: FAMILY MEDICINE

## 2018-12-28 RX ORDER — MECLIZINE HYDROCHLORIDE 25 MG/1
25 TABLET ORAL EVERY 8 HOURS PRN
Qty: 50 TABLET | Refills: 0 | Status: SHIPPED | OUTPATIENT
Start: 2018-12-28

## 2018-12-28 NOTE — PROGRESS NOTES
Assessment/Plan: Aortic heart murmur  Will check an echocardiogram     Acute nonintractable headache  With the vertigo, will get an MRI of the brain  Vertigo  MRI of the brain  Diagnoses and all orders for this visit:    Atypical chest pain  -     Echo complete with contrast if indicated; Future    Acute nonintractable headache, unspecified headache type    Vertigo  -     meclizine (ANTIVERT) 25 mg tablet; Take 1 tablet (25 mg total) by mouth every 8 (eight) hours as needed for dizziness    Aortic heart murmur  -     Echo complete with contrast if indicated; Future          Subjective:   Chief Complaint   Patient presents with    Follow-up     dizziness          Patient ID: Rosario Keith is a 82 y o  female  Patient is an 53-year-old female presenting to the office complaining of continued headache, dizziness, and now complaining of a tightness around her chest and entire body  Gets worse with exertion  Comes and goes  Can occur with rest   States her anxiety is better  The following portions of the patient's history were reviewed and updated as appropriate: allergies, current medications, past family history, past medical history, past social history, past surgical history and problem list     Review of Systems   Constitutional: Positive for activity change, appetite change and fatigue  Negative for unexpected weight change  HENT: Negative  Eyes: Negative for visual disturbance  Respiratory: Negative  Cardiovascular: Positive for chest pain  Negative for palpitations and leg swelling  Gastrointestinal: Negative  Endocrine: Negative  Musculoskeletal: Negative  Skin: Negative  Allergic/Immunologic: Negative for immunocompromised state  Neurological: Positive for dizziness, light-headedness and headaches  Negative for seizures, syncope, facial asymmetry, speech difficulty, weakness and numbness     Psychiatric/Behavioral: Positive for decreased concentration and dysphoric mood  The patient is nervous/anxious  Objective:      /80   Ht 4' 10" (1 473 m)   Wt 53 1 kg (117 lb)   BMI 24 45 kg/m²          Physical Exam   Constitutional: She is oriented to person, place, and time  She appears well-developed and well-nourished  No distress  HENT:   Head: Normocephalic and atraumatic  Right Ear: External ear normal    Left Ear: External ear normal    Nose: Nose normal    Mouth/Throat: Oropharynx is clear and moist    Eyes: Pupils are equal, round, and reactive to light  Conjunctivae and EOM are normal    Neck: Normal range of motion  Neck supple  No JVD present  No tracheal deviation present  No thyromegaly present  Cardiovascular: Normal rate and regular rhythm  Murmur (Grade 3/6 systolic murmur loudest at the right sternal border ) heard  Pulmonary/Chest: Effort normal and breath sounds normal  She has no wheezes  She has no rales  Abdominal: Soft  Bowel sounds are normal  She exhibits no mass  There is no tenderness  There is no rebound and no guarding  Musculoskeletal: Normal range of motion  She exhibits no edema or tenderness  Lymphadenopathy:     She has no cervical adenopathy  Neurological: She is alert and oriented to person, place, and time  She has normal reflexes  No cranial nerve deficit  Skin: Skin is warm and dry  No lesion and no rash noted  Psychiatric: She has a normal mood and affect  Judgment normal    Nursing note and vitals reviewed

## 2019-01-08 ENCOUNTER — HOSPITAL ENCOUNTER (OUTPATIENT)
Dept: MRI IMAGING | Facility: HOSPITAL | Age: 84
Discharge: HOME | End: 2019-01-08
Payer: COMMERCIAL

## 2019-01-08 DIAGNOSIS — R51.9 ACUTE NONINTRACTABLE HEADACHE, UNSPECIFIED HEADACHE TYPE: ICD-10-CM

## 2019-01-08 DIAGNOSIS — R42 VERTIGO: ICD-10-CM

## 2019-01-08 PROCEDURE — 70553 MRI BRAIN STEM W/O & W/DYE: CPT

## 2019-01-08 PROCEDURE — A9585 GADOBUTROL INJECTION: HCPCS | Performed by: FAMILY MEDICINE

## 2019-01-08 RX ADMIN — GADOBUTROL 5 ML: 604.72 INJECTION INTRAVENOUS at 15:13

## 2019-01-09 ENCOUNTER — TELEPHONE (OUTPATIENT)
Dept: FAMILY MEDICINE CLINIC | Facility: CLINIC | Age: 84
End: 2019-01-09

## 2019-01-10 DIAGNOSIS — I10 ESSENTIAL HYPERTENSION: ICD-10-CM

## 2019-01-10 DIAGNOSIS — F41.1 GENERALIZED ANXIETY DISORDER: ICD-10-CM

## 2019-01-10 RX ORDER — HYDRALAZINE HYDROCHLORIDE 25 MG/1
TABLET, FILM COATED ORAL
Qty: 90 TABLET | Refills: 0 | Status: SHIPPED | OUTPATIENT
Start: 2019-01-10 | End: 2019-03-05 | Stop reason: SDUPTHER

## 2019-01-10 RX ORDER — LORAZEPAM 0.5 MG/1
TABLET ORAL
Qty: 60 TABLET | Refills: 0 | Status: SHIPPED | OUTPATIENT
Start: 2019-01-10 | End: 2019-02-12 | Stop reason: SDUPTHER

## 2019-01-22 ENCOUNTER — HOSPITAL ENCOUNTER (OUTPATIENT)
Dept: NON INVASIVE DIAGNOSTICS | Facility: CLINIC | Age: 84
Discharge: HOME | End: 2019-01-22
Payer: COMMERCIAL

## 2019-01-22 DIAGNOSIS — I35.8 AORTIC HEART MURMUR: ICD-10-CM

## 2019-01-22 DIAGNOSIS — R07.89 ATYPICAL CHEST PAIN: ICD-10-CM

## 2019-01-22 PROCEDURE — 93306 TTE W/DOPPLER COMPLETE: CPT

## 2019-01-22 PROCEDURE — 93306 TTE W/DOPPLER COMPLETE: CPT | Performed by: INTERNAL MEDICINE

## 2019-02-04 ENCOUNTER — OFFICE VISIT (OUTPATIENT)
Dept: FAMILY MEDICINE CLINIC | Facility: CLINIC | Age: 84
End: 2019-02-04
Payer: COMMERCIAL

## 2019-02-04 VITALS
DIASTOLIC BLOOD PRESSURE: 80 MMHG | HEIGHT: 58 IN | BODY MASS INDEX: 24.98 KG/M2 | SYSTOLIC BLOOD PRESSURE: 138 MMHG | WEIGHT: 119 LBS

## 2019-02-04 DIAGNOSIS — Z12.39 SCREENING FOR MALIGNANT NEOPLASM OF BREAST: ICD-10-CM

## 2019-02-04 DIAGNOSIS — E78.2 MIXED HYPERLIPIDEMIA: ICD-10-CM

## 2019-02-04 DIAGNOSIS — M81.0 AGE-RELATED OSTEOPOROSIS WITHOUT CURRENT PATHOLOGICAL FRACTURE: ICD-10-CM

## 2019-02-04 DIAGNOSIS — I35.8 AORTIC HEART MURMUR: ICD-10-CM

## 2019-02-04 DIAGNOSIS — R51.9 ACUTE NONINTRACTABLE HEADACHE, UNSPECIFIED HEADACHE TYPE: ICD-10-CM

## 2019-02-04 DIAGNOSIS — I10 ESSENTIAL HYPERTENSION: Primary | ICD-10-CM

## 2019-02-04 DIAGNOSIS — Z00.00 MEDICARE ANNUAL WELLNESS VISIT, SUBSEQUENT: ICD-10-CM

## 2019-02-04 DIAGNOSIS — F41.9 ANXIETY: ICD-10-CM

## 2019-02-04 DIAGNOSIS — N63.10 BREAST MASS, RIGHT: ICD-10-CM

## 2019-02-04 DIAGNOSIS — M15.9 PRIMARY OSTEOARTHRITIS INVOLVING MULTIPLE JOINTS: ICD-10-CM

## 2019-02-04 PROCEDURE — 3075F SYST BP GE 130 - 139MM HG: CPT | Performed by: FAMILY MEDICINE

## 2019-02-04 PROCEDURE — 3079F DIAST BP 80-89 MM HG: CPT | Performed by: FAMILY MEDICINE

## 2019-02-04 PROCEDURE — 1125F AMNT PAIN NOTED PAIN PRSNT: CPT | Performed by: FAMILY MEDICINE

## 2019-02-04 PROCEDURE — 99215 OFFICE O/P EST HI 40 MIN: CPT | Performed by: FAMILY MEDICINE

## 2019-02-04 PROCEDURE — G0439 PPPS, SUBSEQ VISIT: HCPCS | Performed by: FAMILY MEDICINE

## 2019-02-04 PROCEDURE — 1170F FXNL STATUS ASSESSED: CPT | Performed by: FAMILY MEDICINE

## 2019-02-04 RX ORDER — METOPROLOL TARTRATE 100 MG/1
100 TABLET ORAL EVERY 12 HOURS SCHEDULED
Qty: 180 TABLET | Refills: 3 | Status: SHIPPED | OUTPATIENT
Start: 2019-02-04

## 2019-02-04 RX ORDER — CEFADROXIL 500 MG/1
500 CAPSULE ORAL EVERY 12 HOURS SCHEDULED
Qty: 14 CAPSULE | Refills: 0 | Status: SHIPPED | OUTPATIENT
Start: 2019-02-04 | End: 2019-02-11

## 2019-02-04 NOTE — PROGRESS NOTES
Assessment and Plan:    Problem List Items Addressed This Visit        Cardiovascular and Mediastinum    Essential hypertension - Primary     Increased metoprolol to 100 mg twice daily  Recheck in the office in 1 month         Relevant Medications    metoprolol tartrate (LOPRESSOR) 100 mg tablet       Musculoskeletal and Integument    Osteoarthritis    Osteoporosis       Other    Mixed hyperlipidemia     Well controlled on 20 mg of simvastatin a day  Continue same  Recheck lab in June         Anxiety     Doing okay taking lorazepam   Has not tolerated SSRIs or BuSpar in the past          Acute nonintractable headache     Hopefully the increased dose of the beta-blocker will get her blood pressure down possibly help with headaches as well  Aortic heart murmur    Breast mass, right     Hopefully this is related to the irritation in her axilla, will get diagnostic mammogram and ultrasound in the meantime  Last mammogram was 3 years ago  Relevant Medications    cefadroxil (DURICEF) 500 mg capsule    Other Relevant Orders    Mammo diagnostic bilateral w cad    US breast right limited (diagnostic)      Other Visit Diagnoses     Medicare annual wellness visit, subsequent        Screening for malignant neoplasm of breast            Health Maintenance Due   Topic Date Due    Depression Screening PHQ  1935    Medicare Annual Wellness Visit (AWV)  1935    SLP PLAN OF CARE  1935    DTaP,Tdap,and Td Vaccines (1 - Tdap) 01/15/1958    Fall Risk  01/15/2002    Urinary Incontinence Screening  01/15/2002    Pneumococcal PPSV23/PCV13 65+ Years / High and Highest Risk (2 of 2 - PPSV23) 01/12/2017    INFLUENZA VACCINE  07/01/2018         HPI:  Augie Weldon is a 82 y o  female here for her Subsequent Wellness Visit      Patient Active Problem List   Diagnosis    Osteoarthritis    Osteoporosis    Mixed hyperlipidemia    Essential hypertension    Anxiety    Bilateral hip pain    Skin tag    Acute nonintractable headache    Atypical chest pain    Vertigo    Aortic heart murmur    Breast mass, right     Past Medical History:   Diagnosis Date    Ataxia     last assessed 2/16/2017    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    Murmur     last assessed 5/22/2012    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    Right carotid bruit     last assessed 5/22/2012     Past Surgical History:   Procedure Laterality Date    COLONOSCOPY  2007    TONSILLECTOMY AND ADENOIDECTOMY      TOTAL ABDOMINAL HYSTERECTOMY       Family History   Problem Relation Age of Onset    No Known Problems Mother     Other Family         CABG    Hypertension Family     Transient ischemic attack Family      History   Smoking Status    Never Smoker   Smokeless Tobacco    Never Used     History   Alcohol Use No     Comment: history      History   Drug Use No       Current Outpatient Prescriptions   Medication Sig Dispense Refill    amLODIPine-benazepril (LOTREL) 10-20 MG per capsule Take 1 capsule by mouth daily 90 capsule 0    aspirin 81 MG tablet Take 1 tablet by mouth daily      Calcium-Magnesium 333-167 MG TABS Take by mouth      Cholecalciferol (VITAMIN D3) 1000 units CAPS Take by mouth      hydrALAZINE (APRESOLINE) 25 mg tablet TAKE 1 TABLET BY MOUTH TWICE DAILY 90 tablet 0    LORazepam (ATIVAN) 0 5 mg tablet TAKE 1 TABLET BY MOUTH TWICE DAILY AS NEEDED FOR ANXIETY 60 tablet 0    LUMIGAN 0 01 % ophthalmic drops       meclizine (ANTIVERT) 25 mg tablet Take 1 tablet (25 mg total) by mouth every 8 (eight) hours as needed for dizziness 50 tablet 0    Multiple Vitamins-Minerals (WOMENS 50+ MULTI VITAMIN/MIN PO) Take by mouth      simvastatin (ZOCOR) 40 mg tablet Take 1 tablet (40 mg total) by mouth daily at bedtime 90 tablet 0    cefadroxil (DURICEF) 500 mg capsule Take 1 capsule (500 mg total) by mouth every 12 (twelve) hours for 7 days 14 capsule 0    metoprolol tartrate (LOPRESSOR) 100 mg tablet Take 1 tablet (100 mg total) by mouth every 12 (twelve) hours 180 tablet 3     No current facility-administered medications for this visit  Allergies   Allergen Reactions    Hydrocodone-Acetaminophen Dizziness and Fatigue     Immunization History   Administered Date(s) Administered    Influenza 08/16/2011    Influenza Split High Dose Preservative Free IM 10/20/2014    Pneumococcal Conjugate 13-Valent 11/17/2016       Patient Care Team:  Alena Carlin DO as PCP - Theodora Greene MD    Medicare Screening Tests and Risk Assessments:  Miles Brewer is here for her Subsequent Wellness visit  Last Medicare Wellness visit information reviewed, patient interviewed, no change since last AWV  Health Risk Assessment:  Patient rates overall health as good  Patient feels that their physical health rating is Same  Eyesight was rated as Same  Patient feels that their emotional and mental health rating is Same  Pain experienced by patient in the last 7 days has been Some  Patient's pain rating has been 6/10  Patient states that she has experienced no weight loss or gain in last 6 months  Emotional/Mental Health:  Patient has been feeling nervous/anxious  PHQ-9 Depression Screening:    Frequency of the following problems over the past two weeks:      1  Little interest or pleasure in doing things: 0 - not at all      2  Feeling down, depressed, or hopeless: 0 - not at all  PHQ-2 Score: 0          Broken Bones/Falls: Fall Risk Assessment:    In the past year, patient has experienced: No history of falling in past year          Bladder/Bowel:  Patient has leaked urine accidently in the last six months  Patient reports no loss of bowel control  Immunizations:  Patient has not had a flu vaccination within the last year  Patient has received a pneumonia shot  Patient has not received a shingles shot  Home Safety:  Patient does not have trouble with stairs inside or outside of their home     Patient currently reports that there are no safety hazards present in home, working smoke alarms, working carbon monoxide detectors  Preventative Screenings:   Breast cancer screening performed, colon cancer screen completed, cholesterol screen completed, glaucoma eye exam completed,     Nutrition:  Current diet: Regular with servings of the following:    Medications:  Patient is currently taking over-the-counter supplements  Patient is able to manage medications  Lifestyle Choices:  Patient reports no tobacco use  Patient has not smoked or used tobacco in the past   Patient reports no alcohol use  Patient drives a vehicle  Patient wears seat belt  Activities of Daily Living:  Can get out of bed by his or her self, able to dress self, able to make own meals, able to do own shopping, able to bathe self, can do own laundry/housekeeping, can manage own money, pay bills and track expenses    Previous Hospitalizations:  No hospitalization or ED visit in past 12 months        Advanced Directives:  Patient has decided on a power of   Patient has spoken to designated power of   Patient has not completed advanced directive  Preventative Screening/Counseling:      Cardiovascular:      General: Screening Current          Diabetes:      General: Screening Current          Colorectal Cancer:      General: Screening Current          Breast Cancer:      General: Risks and Benefits Discussed          Cervical Cancer:      General: Screening Not Indicated          Osteoporosis:      General: Screening Current          AAA:      General: Screening Not Indicated          Glaucoma:      General: Screening Current      Comments: Has glaucoma and cataracts  Has an appointment in March        HIV:      General: Screening Not Indicated          Hepatitis C:      General: Screening Not Indicated        Advanced Directives:   Patient has living will for healthcare, Information on ACP and/or AD provided   End of life assessment reviewed with patient  Provider agrees with end of life decisions   Additional Comments: In the process of getting a living will  At this point in her life, the patient would like to be resuscitated if she is found without a pulse and respirations  However, if she is deemed terminal or non recoverable she would want to be made comfortable and have life support withdrawn

## 2019-02-04 NOTE — PROGRESS NOTES
Assessment/Plan:    Essential hypertension  Increased metoprolol to 100 mg twice daily  Recheck in the office in 1 month    Acute nonintractable headache  Hopefully the increased dose of the beta-blocker will get her blood pressure down possibly help with headaches as well  Breast mass, right  Hopefully this is related to the irritation in her axilla, will get diagnostic mammogram and ultrasound in the meantime  Last mammogram was 3 years ago  Mixed hyperlipidemia  Well controlled on 20 mg of simvastatin a day  Continue same  Recheck lab in June    Anxiety  Doing okay taking lorazepam   Has not tolerated SSRIs or BuSpar in the past        Diagnoses and all orders for this visit:    Essential hypertension  -     metoprolol tartrate (LOPRESSOR) 100 mg tablet; Take 1 tablet (100 mg total) by mouth every 12 (twelve) hours    Primary osteoarthritis involving multiple joints    Age-related osteoporosis without current pathological fracture    Acute nonintractable headache, unspecified headache type    Anxiety    Aortic heart murmur    Mixed hyperlipidemia    Medicare annual wellness visit, subsequent    Screening for malignant neoplasm of breast  -     Cancel: Mammo screening bilateral w cad; Future    Breast mass, right  -     cefadroxil (DURICEF) 500 mg capsule; Take 1 capsule (500 mg total) by mouth every 12 (twelve) hours for 7 days  -     Mammo diagnostic bilateral w cad; Future  -     US breast right limited (diagnostic); Future          Subjective:      Patient ID: Rosario Keith is a 82 y o  female  Patient is a 43-year-old female presenting to the office for follow-up on hypertension, osteoporosis, hyperlipidemia, anxiety, heart murmur, and headache  She continues to complain of headache, it can happen mostly at home but happens at other places  Happens with exertion and with sedentary activity  Describes it as a dull pressure throughout the head    Her arthritis medicine does help when she takes it   Recent MRI of the brain was normal, lab work did not reveal any possible cause either  She declines a flu shot  She wants me to look at a mass she felt in her right breast   States that she thinks it has been there for couple weeks  Thinks it is related to irritation in her armpits from different deodorants  The following portions of the patient's history were reviewed and updated as appropriate: allergies, current medications, past medical history, past social history and problem list     Review of Systems   Constitutional: Negative for activity change, chills, diaphoresis, fatigue, fever and unexpected weight change  HENT: Negative for congestion, ear pain, hearing loss, nosebleeds, postnasal drip, rhinorrhea, sinus pressure, sore throat and tinnitus  Eyes: Negative for photophobia, pain, discharge, redness and visual disturbance  Respiratory: Negative for cough, chest tightness, shortness of breath and wheezing  Cardiovascular: Negative for chest pain, palpitations and leg swelling  Denies RAMIREZ   Gastrointestinal: Negative for abdominal pain, blood in stool, constipation, diarrhea, nausea and vomiting  Endocrine: Negative  Genitourinary: Negative for difficulty urinating, dysuria, frequency, hematuria and urgency  Musculoskeletal: Negative for arthralgias, joint swelling and myalgias  Skin: Negative for rash and wound  Right breast mass  It is tender  Allergic/Immunologic: Negative for environmental allergies, food allergies and immunocompromised state  Neurological: Negative for dizziness, tremors, seizures, syncope, weakness, numbness and headaches  Hematological: Negative  Psychiatric/Behavioral: Negative for behavioral problems, confusion, decreased concentration and sleep disturbance  The patient is not nervous/anxious            Objective:      /80   Ht 4' 10" (1 473 m)   Wt 54 kg (119 lb)   BMI 24 87 kg/m²          Physical Exam Constitutional: She is oriented to person, place, and time  She appears well-developed and well-nourished  No distress  HENT:   Head: Normocephalic and atraumatic  Right Ear: External ear normal    Left Ear: External ear normal    Nose: Nose normal    Mouth/Throat: Oropharynx is clear and moist    Eyes: Pupils are equal, round, and reactive to light  Conjunctivae and EOM are normal    Neck: Normal range of motion  Neck supple  No JVD present  No tracheal deviation present  No thyromegaly present  Cardiovascular: Normal rate, regular rhythm and normal heart sounds  No murmur heard  Pulmonary/Chest: Effort normal and breath sounds normal  She has no wheezes  She has no rales  Abdominal: Soft  Bowel sounds are normal  She exhibits no mass  There is no tenderness  There is no rebound and no guarding  Musculoskeletal: Normal range of motion  She exhibits no edema or tenderness  Lymphadenopathy:     She has no cervical adenopathy  Neurological: She is alert and oriented to person, place, and time  She has normal reflexes  No cranial nerve deficit  Skin: Skin is warm and dry  No lesion and no rash noted  Right breast with a 4 by 5 cm soft pliant, movable mass in the upper outer quadrant  It is tender  Psychiatric: Judgment normal  Her mood appears anxious   Her speech is rapid and/or pressured and tangential

## 2019-02-04 NOTE — PATIENT INSTRUCTIONS
Obesity   AMBULATORY CARE:   Obesity  is when your body mass index (BMI) is greater than 30  Your healthcare provider will use your height and weight to measure your BMI  The risks of obesity include  many health problems, such as injuries or physical disability  You may need tests to check for the following:  · Diabetes     · High blood pressure or high cholesterol     · Heart disease     · Gallbladder or liver disease     · Cancer of the colon, breast, prostate, liver, or kidney     · Sleep apnea     · Arthritis or gout  Seek care immediately if:   · You have a severe headache, confusion, or difficulty speaking  · You have weakness on one side of your body  · You have chest pain, sweating, or shortness of breath  Contact your healthcare provider if:   · You have symptoms of gallbladder or liver disease, such as pain in your upper abdomen  · You have knee or hip pain and discomfort while walking  · You have symptoms of diabetes, such as intense hunger and thirst, and frequent urination  · You have symptoms of sleep apnea, such as snoring or daytime sleepiness  · You have questions or concerns about your condition or care  Treatment for obesity  focuses on helping you lose weight to improve your health  Even a small decrease in BMI can reduce the risk for many health problems  Your healthcare provider will help you set a weight-loss goal   · Lifestyle changes  are the first step in treating obesity  These include making healthy food choices and getting regular physical activity  Your healthcare provider may suggest a weight-loss program that involves coaching, education, and therapy  · Medicine  may help you lose weight when it is used with a healthy diet and physical activity  · Surgery  can help you lose weight if you are very obese and have other health problems  There are several types of weight-loss surgery  Ask your healthcare provider for more information    Be successful losing weight:   · Set small, realistic goals  An example of a small goal is to walk for 20 minutes 5 days a week  Anther goal is to lose 5% of your body weight  · Tell friends, family members, and coworkers about your goals  and ask for their support  Ask a friend to lose weight with you, or join a weight-loss support group  · Identify foods or triggers that may cause you to overeat , and find ways to avoid them  Remove tempting high-calorie foods from your home and workplace  Place a bowl of fresh fruit on your kitchen counter  If stress causes you to eat, then find other ways to cope with stress  · Keep a diary to track what you eat and drink  Also write down how many minutes of physical activity you do each day  Weigh yourself once a week and record it in your diary  Eating changes: You will need to eat 500 to 1,000 fewer calories each day than you currently eat to lose 1 to 2 pounds a week  The following changes will help you cut calories:  · Eat smaller portions  Use small plates, no larger than 9 inches in diameter  Fill your plate half full of fruits and vegetables  Measure your food using measuring cups until you know what a serving size looks like  · Eat 3 meals and 1 or 2 snacks each day  Plan your meals in advance  Rosa Jarvis and eat at home most of the time  Eat slowly  · Eat fruits and vegetables at every meal   They are low in calories and high in fiber, which makes you feel full  Do not add butter, margarine, or cream sauce to vegetables  Use herbs to season steamed vegetables  · Eat less fat and fewer fried foods  Eat more baked or grilled chicken and fish  These protein sources are lower in calories and fat than red meat  Limit fast food  Dress your salads with olive oil and vinegar instead of bottled dressing  · Limit the amount of sugar you eat  Do not drink sugary beverages  Limit alcohol  Activity changes:  Physical activity is good for your body in many ways   It helps you burn calories and build strong muscles  It decreases stress and depression, and improves your mood  It can also help you sleep better  Talk to your healthcare provider before you begin an exercise program   · Exercise for at least 30 minutes 5 days a week  Start slowly  Set aside time each day for physical activity that you enjoy and that is convenient for you  It is best to do both weight training and an activity that increases your heart rate, such as walking, bicycling, or swimming  · Find ways to be more active  Do yard work and housecleaning  Walk up the stairs instead of using elevators  Spend your leisure time going to events that require walking, such as outdoor festivals or fairs  This extra physical activity can help you lose weight and keep it off  Follow up with your healthcare provider as directed: You may need to meet with a dietitian  Write down your questions so you remember to ask them during your visits  © 2017 [employer] Information is for End User's use only and may not be sold, redistributed or otherwise used for commercial purposes  All illustrations and images included in CareNotes® are the copyrighted property of [employer]  or [employer]  The above information is an  only  It is not intended as medical advice for individual conditions or treatments  Talk to your doctor, nurse or pharmacist before following any medical regimen to see if it is safe and effective for you  Urinary Incontinence   WHAT YOU NEED TO KNOW:   What is urinary incontinence? Urinary incontinence (UI) is when you lose control of your bladder  What causes UI? UI occurs because your bladder cannot store or empty urine properly  The following are the most common types of UI:  · Stress incontinence  is when you leak urine due to increased bladder pressure  This may happen when you cough, sneeze, or exercise       · Urge incontinence  is when you feel the need to urinate right away and leak urine accidentally  · Mixed incontinence  is when you have both stress and urge UI  What are the signs and symptoms of UI?   · You feel like your bladder does not empty completely when you urinate  · You urinate often and need to urinate immediately  · You leak urine when you sleep, or you wake up with the urge to urinate  · You leak urine when you cough, sneeze, exercise, or laugh  How is UI diagnosed? Your healthcare provider will ask how often you leak urine and whether you have stress or urge symptoms  Tell him which medicines you take, how often you urinate, and how much liquid you drink each day  You may need any of the following tests:  · Urine tests  may show infection or kidney function  · A pelvic exam  may be done to check for blockages  A pelvic exam will also show if your bladder, uterus, or other organs have moved out of place  · An x-ray, ultrasound, or CT  may show problems with parts of your urinary system  You may be given contrast liquid to help your organs show up better in the pictures  Tell the healthcare provider if you have ever had an allergic reaction to contrast liquid  Do not enter the MRI room with anything metal  Metal can cause serious injury  Tell the healthcare provider if you have any metal in or on your body  · A bladder scan  will show how much urine is left in your bladder after you urinate  You will be asked to urinate and then healthcare providers will use a small ultrasound machine to check the urine left in your bladder  · Cystometry  is used to check the function of your urinary system  Your healthcare provider checks the pressure in your bladder while filling it with fluid  Your bladder pressure may also be tested when your bladder is full and while you urinate  How is UI treated? · Medicines  can help strengthen your bladder control      · Electrical stimulation  is used to send a small amount of electrical energy to your pelvic floor muscles  This helps control your bladder function  Electrodes may be placed outside your body or in your rectum  For women, the electrodes may be placed in the vagina  · A bulking agent  may be injected into the wall of your urethra to make it thicker  This helps keep your urethra closed and decreases urine leakage  · Devices  such as a clamp, pessary, or tampon may help stop urine leaks  Ask your healthcare provider for more information about these and other devices  · Surgery  may be needed if other treatments do not work  Several types of surgery can help improve your bladder control  Ask your healthcare provider for more information about the surgery you may need  How can I manage my symptoms? · Do pelvic muscle exercises often  Your pelvic muscles help you stop urinating  Squeeze these muscles tight for 5 seconds, then relax for 5 seconds  Gradually work up to squeezing for 10 seconds  Do 3 sets of 15 repetitions a day, or as directed  This will help strengthen your pelvic muscles and improve bladder control  · A catheter  may be used to help empty your bladder  A catheter is a tiny, plastic tube that is put into your bladder to drain your urine  Your healthcare provider may tell you to use a catheter to prevent your bladder from getting too full and leaking urine  · Keep a UI record  Write down how often you leak urine and how much you leak  Make a note of what you were doing when you leaked urine  · Train your bladder  Go to the bathroom at set times, such as every 2 hours, even if you do not feel the urge to go  You can also try to hold your urine when you feel the urge to go  For example, hold your urine for 5 minutes when you feel the urge to go  As that becomes easier, hold your urine for 10 minutes  · Drink liquids as directed  Ask your healthcare provider how much liquid to drink each day and which liquids are best for you   You may need to limit the amount of liquid you drink to help control your urine leakage  Limit or do not have drinks that contain caffeine or alcohol  Do not drink any liquid right before you go to bed  · Prevent constipation  Eat a variety of high-fiber foods  Good examples are high-fiber cereals, beans, vegetables, and whole-grain breads  Prune juice may help make your bowel movement softer  Walking is the best way to trigger your intestines to have a bowel movement  · Exercise regularly and maintain a healthy weight  Ask your healthcare provider how much you should weigh and about the best exercise plan for you  Weight loss and exercise will decrease pressure on your bladder and help you control your leakage  Ask him to help you create a weight loss plan if you are overweight  When should I seek immediate care? · You have severe pain  · You are confused or cannot think clearly  When should I contact my healthcare provider? · You have a fever  · You see blood in your urine  · You have pain when you urinate  · You have new or worse pain, even after treatment  · Your mouth feels dry or you have vision changes  · Your urine is cloudy or smells bad  · You have questions or concerns about your condition or care  CARE AGREEMENT:   You have the right to help plan your care  Learn about your health condition and how it may be treated  Discuss treatment options with your caregivers to decide what care you want to receive  You always have the right to refuse treatment  The above information is an  only  It is not intended as medical advice for individual conditions or treatments  Talk to your doctor, nurse or pharmacist before following any medical regimen to see if it is safe and effective for you  © 2017 [employer] Information is for End User's use only and may not be sold, redistributed or otherwise used for commercial purposes   All illustrations and images included in CareNotes® are the copyrighted property of [employer] M [employer]  or Kin [employer]er  Cigarette Smoking and Your Health   AMBULATORY CARE:   Risks to your health if you smoke:  Nicotine and other chemicals found in tobacco damage every cell in your body  Even if you are a light smoker, you have an increased risk for cancer, heart disease, and lung disease  If you are pregnant or have diabetes, smoking increases your risk for complications  Benefits to your health if you stop smoking:   · You decrease respiratory symptoms such as coughing, wheezing, and shortness of breath  · You reduce your risk for cancers of the lung, mouth, throat, kidney, bladder, pancreas, stomach, and cervix  If you already have cancer, you increase the benefits of chemotherapy  You also reduce your risk for cancer returning or a second cancer from developing  · You reduce your risk for heart disease, blood clots, heart attack, and stroke  · You reduce your risk for lung infections, and diseases such as pneumonia, asthma, chronic bronchitis, and emphysema  · Your circulation improves  More oxygen can be delivered to your body  If you have diabetes, you lower your risk for complications, such as kidney, artery, and eye diseases  You also lower your risk for nerve damage  Nerve damage can lead to amputations, poor vision, and blindness  · You improve your body's ability to heal and to fight infections  Benefits to the health of others if you stop smoking:  Tobacco is harmful to nonsmokers who breathe in your secondhand smoke  The following are ways the health of others around you may improve when you stop smoking:  · You lower the risks for lung cancer and heart disease in nonsmoking adults  · If you are pregnant, you lower the risk for miscarriage, early delivery, low birth weight, and stillbirth  You also lower your baby's risk for SIDS, obesity, developmental delay, and neurobehavioral problems, such as ADHD  · If you have children, you lower their risk for ear infections, colds, pneumonia, bronchitis, and asthma  For more information and support to stop smoking:   · Smokefree  gov  Phone: 7- 985 - 340-7023  Web Address: www smokefree  gov  Follow up with your healthcare provider as directed:  Write down your questions so you remember to ask them during your visits  © 2017 [address] [employer] Information is for End User's use only and may not be sold, redistributed or otherwise used for commercial purposes  All illustrations and images included in CareNotes® are the copyrighted property of A [employer] M [employer]  or Kin [employer]er  The above information is an  only  It is not intended as medical advice for individual conditions or treatments  Talk to your doctor, nurse or pharmacist before following any medical regimen to see if it is safe and effective for you  Fall Prevention   AMBULATORY CARE:   Fall prevention  includes ways to make your home and other areas safer  It also includes ways you can move more carefully to prevent a fall  Health conditions that cause changes in your blood pressure, vision, or muscle strength and coordination may increase your risk for falls  Medicines may also increase your risk for falls if they make you dizzy, weak, or sleepy  Call 911 or have someone else call if:   · You have fallen and are unconscious  · You have fallen and cannot move part of your body  Contact your healthcare provider if:   · You have fallen and have pain or a headache  · You have questions or concerns about your condition or care  Fall prevention tips:   · Stand or sit up slowly  This may help you keep your balance and prevent falls  · Use assistive devices as directed  Your healthcare provider may suggest that you use a cane or walker to help you keep your balance  You may need to have grab bars put in your bathroom near the toilet or in the shower      · Wear shoes that fit well and have soles that   Wear shoes both inside and outside  Use slippers with good   Do not wear shoes with high heels  · Wear a personal alarm  This is a device that allows you to call 911 if you fall and need help  Ask your healthcare provider for more information  · Stay active  Exercise can help strengthen your muscles and improve your balance  Your healthcare provider may recommend water aerobics or walking  He or she may also recommend physical therapy to improve your coordination  Never start an exercise program without talking to your healthcare provider first      · Manage your medical conditions  Keep all appointments with your healthcare providers  Visit your eye doctor as directed  Home safety tips:   · Add items to prevent falls in the bathroom  Put nonslip strips on your bath or shower floor to prevent you from slipping  Use a bath mat if you do not have carpet in the bathroom  This will prevent you from falling when you step out of the bath or shower  Use a shower seat so you do not need to stand while you shower  Sit on the toilet or a chair in your bathroom to dry yourself and put on clothing  This will prevent you from losing your balance from drying or dressing yourself while you are standing  · Keep paths clear  Remove books, shoes, and other objects from walkways and stairs  Place cords for telephones and lamps out of the way so that you do not need to walk over them  Tape them down if you cannot move them  Remove small rugs  If you cannot remove a rug, secure it with double-sided tape  This will prevent you from tripping  · Install bright lights in your home  Use night lights to help light paths to the bathroom or kitchen  Always turn on the light before you start walking  · Keep items you use often on shelves within reach  Do not use a step stool to help you reach an item  · Paint or place reflective tape on the edges of your stairs    This will help you see the stairs better  Follow up with your healthcare provider as directed:  Write down your questions so you remember to ask them during your visits  © 2017 [address] [employer] Information is for End User's use only and may not be sold, redistributed or otherwise used for commercial purposes  All illustrations and images included in CareNotes® are the copyrighted property of [employer]  or Kin [employer]  The above information is an  only  It is not intended as medical advice for individual conditions or treatments  Talk to your doctor, nurse or pharmacist before following any medical regimen to see if it is safe and effective for you  Advance Directives   WHAT YOU NEED TO KNOW:   What are advance directives? Advance directives are legal documents that state your wishes and plans for medical care  These plans are made ahead of time in case you lose your ability to make decisions for yourself  Advance directives can apply to any medical decision, such as the treatments you want, and if you want to donate organs  What are the types of advance directives? There are many types of advance directives, and each state has rules about how to use them  You may choose a combination of any of the following:  · Living will: This is a written record of the treatment you want  You can also choose which treatments you do not want, which to limit, and which to stop at a certain time  This includes surgery, medicine, IV fluid, and tube feedings  · Durable power of  for healthcare [facility]): This is a written record that states who you want to make healthcare choices for you when you are unable to make them for yourself  This person, called a proxy, is usually a family member or a friend  You may choose more than 1 proxy  · Do not resuscitate (DNR) order:  A DNR order is used in case your heart stops beating or you stop breathing   It is a request not to have certain forms of treatment, such as CPR  A DNR order may be included in other types of advance directives  · Medical directive: This covers the care that you want if you are in a coma, near death, or unable to make decisions for yourself  You can list the treatments you want for each condition  Treatment may include pain medicine, surgery, blood transfusions, dialysis, IV or tube feedings, and a ventilator (breathing machine)  · Values history: This document has questions about your views, beliefs, and how you feel and think about life  This information can help others choose the care that you would choose  Why are advance directives important? An advance directive helps you control your care  Although spoken wishes may be used, it is better to have your wishes written down  Spoken wishes can be misunderstood, or not followed  Treatments may be given even if you do not want them  An advance directive may make it easier for your family to make difficult choices about your care  How do I decide what to put in my advance directives? · Make informed decisions:  Make sure you fully understand treatments or care you may receive  Think about the benefits and problems your decisions could cause for you or your family  Talk to healthcare providers if you have concerns or questions before you write down your wishes  You may also want to talk with your Mosque or , or a   Check your state laws to make sure that what you put in your advance directive is legal      · Sign all forms:  Sign and date your advance directive when you have finished  You may also need 2 witnesses to sign the forms  Witnesses cannot be your doctor or his staff, your spouse, heirs or beneficiaries, people you owe money to, or your chosen proxy  Talk to your family, proxy, and healthcare providers about your advance directive  Give each person a copy, and keep one for yourself in a place you can get to easily   Do not keep it hidden or locked away  · Review and revise your plans: You can revise your advance directive at any time, as long as you are able to make decisions  Review your plan every year, and when there are changes in your life, or your health  When you make changes, let your family, proxy, and healthcare providers know  Give each a new copy  Where can I find more information? · [employer]  [address] , [place]  Phone: 2- [phone] - [phone]  Phone: 3- [phone] - [phone]  Web Address: http://www  aafp org  · [address] [facility])  [address] [address] [place] [address], [address]  Phone: [phone]  Phone: [phone]  Web Address: Nini guadarrama  CARE AGREEMENT:   You have the right to help plan your care  To help with this plan, you must learn about your health condition and treatment options  You must also learn about advance directives and how they are used  Work with your healthcare providers to decide what care will be used to treat you  You always have the right to refuse treatment  The above information is an  only  It is not intended as medical advice for individual conditions or treatments  Talk to your doctor, nurse or pharmacist before following any medical regimen to see if it is safe and effective for you  © 2017 [address] [employer] Information is for End User's use only and may not be sold, redistributed or otherwise used for commercial purposes  All illustrations and images included in CareNotes® are the copyrighted property of A [employer] M [employer]  or Kin [employer]er

## 2019-02-04 NOTE — ASSESSMENT & PLAN NOTE
Hopefully the increased dose of the beta-blocker will get her blood pressure down possibly help with headaches as well

## 2019-02-04 NOTE — ASSESSMENT & PLAN NOTE
Hopefully this is related to the irritation in her axilla, will get diagnostic mammogram and ultrasound in the meantime  Last mammogram was 3 years ago

## 2019-02-06 ENCOUNTER — HOSPITAL ENCOUNTER (OUTPATIENT)
Dept: ULTRASOUND IMAGING | Facility: CLINIC | Age: 84
Discharge: HOME | End: 2019-02-06
Payer: COMMERCIAL

## 2019-02-06 ENCOUNTER — HOSPITAL ENCOUNTER (OUTPATIENT)
Dept: MAMMOGRAPHY | Facility: CLINIC | Age: 84
Discharge: HOME | End: 2019-02-06
Payer: COMMERCIAL

## 2019-02-06 VITALS — HEIGHT: 58 IN | BODY MASS INDEX: 25.19 KG/M2 | WEIGHT: 120 LBS

## 2019-02-06 DIAGNOSIS — N63.10 BREAST MASS, RIGHT: ICD-10-CM

## 2019-02-06 PROCEDURE — G0279 TOMOSYNTHESIS, MAMMO: HCPCS

## 2019-02-06 PROCEDURE — 77066 DX MAMMO INCL CAD BI: CPT

## 2019-02-06 PROCEDURE — 76642 ULTRASOUND BREAST LIMITED: CPT

## 2019-02-06 NOTE — PROGRESS NOTES
Met with patient and Dr Lopez Rivera regarding recommendation for;      __x___ RIGHT ______LEFT      __x___Ultrasound guided  ______Stereotactic  Breast biopsy  __x___Verbalized understanding        Blood thinners:  _____yes ___x__no    Date stopped: _____n/a______  Lonza Cynthia ASA only    Biopsy teaching sheet given:  ____x___yes ______no

## 2019-02-12 ENCOUNTER — HOSPITAL ENCOUNTER (OUTPATIENT)
Dept: ULTRASOUND IMAGING | Facility: CLINIC | Age: 84
Discharge: HOME | End: 2019-02-12

## 2019-02-12 DIAGNOSIS — F41.1 GENERALIZED ANXIETY DISORDER: ICD-10-CM

## 2019-02-12 RX ORDER — LORAZEPAM 0.5 MG/1
TABLET ORAL
Qty: 60 TABLET | Refills: 0 | Status: SHIPPED | OUTPATIENT
Start: 2019-02-12 | End: 2019-03-13 | Stop reason: SDUPTHER

## 2019-02-14 ENCOUNTER — HOSPITAL ENCOUNTER (OUTPATIENT)
Dept: ULTRASOUND IMAGING | Facility: CLINIC | Age: 84
Discharge: HOME | End: 2019-02-14
Payer: COMMERCIAL

## 2019-02-14 ENCOUNTER — HOSPITAL ENCOUNTER (OUTPATIENT)
Dept: MAMMOGRAPHY | Facility: CLINIC | Age: 84
Discharge: HOME | End: 2019-02-14

## 2019-02-14 ENCOUNTER — HOSPITAL ENCOUNTER (OUTPATIENT)
Dept: RADIOLOGY | Facility: HOSPITAL | Age: 84
Discharge: HOME | End: 2019-02-14

## 2019-02-14 VITALS — SYSTOLIC BLOOD PRESSURE: 170 MMHG | HEART RATE: 70 BPM | DIASTOLIC BLOOD PRESSURE: 74 MMHG

## 2019-02-14 DIAGNOSIS — R92.8 ABNORMAL ULTRASOUND OF BREAST: ICD-10-CM

## 2019-02-14 DIAGNOSIS — N63.0 BREAST SWELLING: ICD-10-CM

## 2019-02-14 DIAGNOSIS — R92.8 ABNORMAL MAMMOGRAM: ICD-10-CM

## 2019-02-14 PROCEDURE — 88361 TUMOR IMMUNOHISTOCHEM/COMPUT: CPT | Performed by: PATHOLOGY

## 2019-02-14 PROCEDURE — 88305 TISSUE EXAM BY PATHOLOGIST: CPT | Performed by: PATHOLOGY

## 2019-02-14 PROCEDURE — 88342 IMHCHEM/IMCYTCHM 1ST ANTB: CPT | Performed by: PATHOLOGY

## 2019-02-14 PROCEDURE — 19083 BX BREAST 1ST LESION US IMAG: CPT

## 2019-02-14 PROCEDURE — 76942 ECHO GUIDE FOR BIOPSY: CPT

## 2019-02-14 RX ORDER — LIDOCAINE HYDROCHLORIDE 10 MG/ML
4 INJECTION, SOLUTION INFILTRATION; PERINEURAL ONCE
Status: COMPLETED | OUTPATIENT
Start: 2019-02-14 | End: 2019-02-14

## 2019-02-14 RX ORDER — LIDOCAINE HYDROCHLORIDE 10 MG/ML
4 INJECTION, SOLUTION INFILTRATION; PERINEURAL ONCE
Status: DISCONTINUED | OUTPATIENT
Start: 2019-02-14 | End: 2019-02-15 | Stop reason: HOSPADM

## 2019-02-14 RX ADMIN — LIDOCAINE HYDROCHLORIDE 4 ML: 10 INJECTION, SOLUTION INFILTRATION; PERINEURAL at 13:04

## 2019-02-14 RX ADMIN — LIDOCAINE HYDROCHLORIDE 4 ML: 10 INJECTION, SOLUTION INFILTRATION; PERINEURAL at 13:10

## 2019-02-14 NOTE — DISCHARGE INSTR - OTHER ORDERS
POST LARGE CORE BREAST BIOPSY PATIENT INFORMATION      1  Place an ice pack inside your bra over the top of the dressing every hour for 20 minutes (20 minutes on, 60 minutes off)  Do this until bedtime  2  Do not shower or bathe until the following morning  3  You may bathe your breast carefully with the steri-strips in place  Be careful    Not to loosen them  The steri-strips will fall off in 3-5 days  4  You may have mild discomfort, and you may have some bruising where the   Needle entered the skin  This should clear within 5-7 days  5  If you need medicine for discomfort, take acetaminophen products such as   Tylenol  You may also take Advil or Motrin products  6  Do not participate in strenuous activities such as-tennis, aerobics, skiing,  Weight lifting, etc  for 24 hours  Refrain from swimming/soaking for 72 hours  7  Wearing a bra for sleeping may be more comfortable for the first 24-48 hours  8  Watch for continued bleeding, pain or fever over 101; please call with any questions or concerns  For procedures done at the [facility] call:  Guillermo Loredo RN at [phone]  Mehdi Vogt RN at [phone]                    *After 4 PM call the Interventional Radiology Department                    [phone] and ask to speak with the nurse on call  For procedures done at the [address] [place] [address] call:         Dmitry Hess RN at   *After 4 PM call the Interventional Radiology Department   [phone] and ask to speak with the nurse on call  For procedures done at [address] [place] [address] call: The Radiology Nurse at [phone]  *After 4 PM call your physician, or go to the Emergency Department  9          The final results of your biopsy are usually available within one week

## 2019-02-14 NOTE — PROGRESS NOTES
Procedure type:    _x____ultrasound guided _____stereotactic    Breast:    _____Left __x___Right    Location: 9:00 5cm fn    Needle: 12g marquee    # of passes: 2    Clip: butterfly    Performed by: Dr Kanu Tinoco    Pressure held for 5 minutes by: Dr Kanu Tinoco    Steri Strips:    __x___yes _____no    Band aid:    __x___yes_____no    Tape and guaze:    _____yes ___x__no    Tolerated procedure:    __x___yes _____no    Procedure type:    __x___ultrasound guided _____stereotactic    Breast:    _____Left __x___Right    Location: right axilla    Needle: 16g marquee    # of passes: 2    Clip: open coil    Performed by: Dr Ruby Sanz held for 5 minutes by: Keiry Em    Steri Strips:    __x___yes _____no    Band aid:    __x___yes_____no    Tape and guaze:    _____yes __x___no    Tolerated procedure:    ___x__yes _____no

## 2019-02-14 NOTE — PROGRESS NOTES
Ice pack given:    __x___yes _____no    Discharge instructions signed by patient:    ___x__yes _____no    Discharge instructions given to patient:    __x___yes _____no    Discharged via:    ___x__amulatory    _____wheelchair    _____stretcher    Stable on discharge:    __x___yes ____no

## 2019-02-15 DIAGNOSIS — C50.911 INVASIVE DUCTAL CARCINOMA OF BREAST, FEMALE, RIGHT (HCC): Primary | ICD-10-CM

## 2019-02-15 NOTE — PROGRESS NOTES
Post procedure call completed on 2/15/19 @ 0842    Bleeding: _____yes __x___no    Pain: __x___yes ______no    Redness/Swelling: ___x___yes _____no  (mild swelling)    Band aid removed: _____yes __x___no    Steri-Strips intact: ___x___yes _____no    Pt reports intermittent mild shooting pain near underarm and nipple and using the ice pack as directed along with arthritis pain med

## 2019-02-18 ENCOUNTER — CONSULT (OUTPATIENT)
Dept: SURGICAL ONCOLOGY | Facility: CLINIC | Age: 84
End: 2019-02-18
Payer: COMMERCIAL

## 2019-02-18 ENCOUNTER — DOCUMENTATION (OUTPATIENT)
Dept: SURGICAL ONCOLOGY | Facility: CLINIC | Age: 84
End: 2019-02-18

## 2019-02-18 VITALS
HEART RATE: 76 BPM | WEIGHT: 119 LBS | TEMPERATURE: 96.1 F | SYSTOLIC BLOOD PRESSURE: 140 MMHG | RESPIRATION RATE: 14 BRPM | DIASTOLIC BLOOD PRESSURE: 90 MMHG | BODY MASS INDEX: 24.98 KG/M2 | HEIGHT: 58 IN

## 2019-02-18 DIAGNOSIS — C50.411 MALIGNANT NEOPLASM OF UPPER-OUTER QUADRANT OF RIGHT FEMALE BREAST, UNSPECIFIED ESTROGEN RECEPTOR STATUS (HCC): Primary | ICD-10-CM

## 2019-02-18 DIAGNOSIS — C77.3 CARCINOMA OF RIGHT BREAST METASTATIC TO AXILLARY LYMPH NODE (HCC): ICD-10-CM

## 2019-02-18 DIAGNOSIS — C50.911 CARCINOMA OF RIGHT BREAST METASTATIC TO AXILLARY LYMPH NODE (HCC): ICD-10-CM

## 2019-02-18 PROCEDURE — 99204 OFFICE O/P NEW MOD 45 MIN: CPT | Performed by: SURGERY

## 2019-02-18 NOTE — PROGRESS NOTES
Breast Consultation-Surgical Oncology     [address]  [facility] [place] [address]    Name:  Cholo Barlow  YOB: 1935  MRN:  [id number]    Assessment/Plan   Diagnoses and all orders for this visit:    Malignant neoplasm of upper-outer quadrant of right female breast, unspecified estrogen receptor status ([facility] [address] )  -     CT chest abdomen pelvis wo contrast; Future  -     NM bone scan whole body; Future  -     Ambulatory referral to Hematology / Oncology; Future    Carcinoma of right breast 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 ([facility] [address] )  -     CT chest abdomen pelvis wo contrast; Future  -     NM bone scan whole body; Future  -     Ambulatory referral to Hematology / Oncology; Future      HPI: Cholo Barlow is a 82y o  year old female who presents with a right breast cancer  She shares she noticed her right breast was becoming painful, swollen and firm to touch  She had abnormal breast imaging which led to a right breast biopsy and axillary node biopsy  This revealed an IDC with metastasis to her lymph node  Surgical treatment to date consisted of n/a  Oncology History:     No history exists         Pertinent reproductive history:  Age at menarche:  17  OB History        2    Para   1    Term   1            AB        Living           SAB        TAB        Ectopic        Multiple        Live Births               Obstetric Comments   Menarche : 17  Hx of BCP  hysterectomy           Age at first live birth:  34  Age at menopause:  unk  Hysterectomy/Oophrectomy:  Yes  Hormone replacement therapy:  No  Birth control pills:  Yes    Problem List:   Patient Active Problem List   Diagnosis    Osteoarthritis    Osteoporosis    Mixed hyperlipidemia    Essential hypertension    Anxiety    Bilateral hip pain    Skin tag    Acute nonintractable headache    Atypical chest pain    Vertigo    Aortic heart murmur    Breast mass, right    Malignant neoplasm of upper-outer quadrant of right female breast [facility])     Past Medical History:   Diagnosis Date    Ataxia     last assessed 2/16/2017    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    Murmur     last assessed 5/22/2012    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    Right carotid bruit     last assessed 5/22/2012     Past Surgical History:   Procedure Laterality Date    COLONOSCOPY  2007    TONSILLECTOMY AND ADENOIDECTOMY      TOTAL ABDOMINAL HYSTERECTOMY      US GUIDED BREAST BIOPSY RIGHT COMPLETE Right 2/14/2019    US GUIDED BREAST LYMPH NODE BIOPSY RIGHT Right 2/14/2019     Family History   Problem Relation Age of Onset    No Known Problems Mother     Other Family         CABG    Hypertension Family     Transient ischemic attack Family      Social History     Socioeconomic History    Marital status: Single     Spouse name: Not on file    Number of children: Not on file    Years of education: Not on file    Highest education level: Not on file   Occupational History    Not on file   Social Needs    Financial resource strain: Not on file    Food insecurity:     Worry: Not on file     Inability: Not on file    Transportation needs:     Medical: Not on file     Non-medical: Not on file   Tobacco Use    Smoking status: Former Smoker    Smokeless tobacco: Never Used    Tobacco comment: quit in 1980   Substance and Sexual Activity    Alcohol use: No     Comment: history    Drug use: No    Sexual activity: Not on file   Lifestyle    Physical activity:     Days per week: Not on file     Minutes per session: Not on file    Stress: Not on file   Relationships    Social connections:     Talks on phone: Not on file     Gets together: Not on file     Attends Islam service: Not on file     Active member of club or organization: Not on file     Attends meetings of clubs or organizations: Not on file     Relationship status: Not on file    Intimate partner violence:     Fear of current or ex partner: Not on file     Emotionally abused: Not on file     Physically abused: Not on file     Forced sexual activity: Not on file   Other Topics Concern    Not on file   Social History Narrative    Denied:  History of housing without smoke detectors    Uses safety equipment - seatbelts     Current Outpatient Medications   Medication Sig Dispense Refill    amLODIPine-benazepril (LOTREL) 10-20 MG per capsule Take 1 capsule by mouth daily 90 capsule 0    aspirin 81 MG tablet Take 1 tablet by mouth daily      Calcium-Magnesium 333-167 MG TABS Take by mouth      Cholecalciferol (VITAMIN D3) 1000 units CAPS Take 400 Units by mouth        hydrALAZINE (APRESOLINE) 25 mg tablet TAKE 1 TABLET BY MOUTH TWICE DAILY 90 tablet 0    LORazepam (ATIVAN) 0 5 mg tablet TAKE 1 TABLET BY MOUTH TWICE DAILY AS NEEDED FOR  ANXIETY 60 tablet 0    LUMIGAN 0 01 % ophthalmic drops       meclizine (ANTIVERT) 25 mg tablet Take 1 tablet (25 mg total) by mouth every 8 (eight) hours as needed for dizziness 50 tablet 0    metoprolol tartrate (LOPRESSOR) 100 mg tablet Take 1 tablet (100 mg total) by mouth every 12 (twelve) hours 180 tablet 3    Multiple Vitamins-Minerals (WOMENS 50+ MULTI VITAMIN/MIN PO) Take by mouth      simvastatin (ZOCOR) 40 mg tablet Take 1 tablet (40 mg total) by mouth daily at bedtime 90 tablet 0     No current facility-administered medications for this visit  Allergies   Allergen Reactions    Hydrocodone-Acetaminophen Dizziness and Fatigue         The following portions of the patient's history were reviewed and updated as appropriate: allergies, current medications, past family history, past medical history, past social history, past surgical history and problem list     Review of Systems:  Review of Systems   Constitutional: Negative  Negative for appetite change and fever  Eyes: Negative  Respiratory: Negative for shortness of breath  Cardiovascular: Negative      Gastrointestinal: Negative  Endocrine: Negative  Genitourinary: Negative  Musculoskeletal: Negative  Negative for arthralgias and myalgias  Skin: Negative  Allergic/Immunologic: Negative  Neurological: Negative  Hematological: Negative  Negative for adenopathy  Does not bruise/bleed easily  Psychiatric/Behavioral: Negative  Physical Exam:  Physical Exam   Constitutional: She is oriented to person, place, and time  She appears well-developed and well-nourished  HENT:   Head: Normocephalic and atraumatic  Cardiovascular: Normal heart sounds  Pulmonary/Chest: Breath sounds normal  Right breast exhibits mass and skin change (edema throughout the breast)  Right breast exhibits no inverted nipple, no nipple discharge and no tenderness  Left breast exhibits no inverted nipple, no mass, no nipple discharge, no skin change and no tenderness  Breasts are asymmetrical    Large mass in the outer portion of the right breast with firmness throughout along with skin thickening/edema       Abdominal: Soft  Lymphadenopathy:     She has axillary adenopathy ( large palpable mass in the right axilla that appears to be mobile)  Right axillary: Pectoral and lateral adenopathy present  Left axillary: No pectoral and no lateral adenopathy present  Right: No supraclavicular adenopathy present  Left: No supraclavicular adenopathy present  Neurological: She is alert and oriented to person, place, and time  Psychiatric: She has a normal mood and affect         Laboratory:  2019 core biopsy of the right breast and axilla    Pathology revealed: invasive ductal carcinoma    Histologic grade: poorly differentiated     Angiolymphatic invasion:  absent    Tumor node status:  Positive    Hormone receptor status:  Pending      [id number]  3D Nathaniel Dx Mammogram right breast US  B5 (3) 3 5 cm spiculated mass with multiple satellite nodules, there are also multiple enlarged axillary nodes, there is diffuse skin thickening as well  02/14/19  Right breast biopsy, right axillary biopsy        Discussion/Summary:  53-year-old female who presents today secondary to newly diagnosed carcinoma of the right breast   She has diffuse involvement in the breast and skin as well as involved axillary node  Her hormone receptors are pending  I discussed these findings with the patient and her son who accompanies her today  We reviewed the multimodality treatment of breast cancer to include surgery, medical therapy and radiation  She has at least a locally advanced carcinoma  I am recommending staging CT scans and a bone scan  I am also referring her to Medical Oncology  I will call her with the results of the scans  Further recommendations will be made at that time  If there is no distant disease, she will still likely benefit from neoadjuvant medical therapy given her presentation

## 2019-02-18 NOTE — LETTER
2019     Ladvicki Torrance, DO  [address]    Patient: Tracy Catherine   YOB: 1935   Date of Visit: 2019       Dear Dr Gurrola Slider: Thank you for referring Mckinley Olmedojorgealpa to me for evaluation  Below are my notes for this consultation  If you have questions, please do not hesitate to call me  I look forward to following your patient along with you  Sincerely,        Nilda Franco MD        CC: No Recipients  Nilda Franco MD  2019  3:04 PM  Sign at close encounter    Breast Consultation-Surgical Oncology     [address] [place] [address]  [phone]  [id number]    Name:  Tracy Catherine  YOB: 1935  MRN:  [id number]    Assessment/Plan   Diagnoses and all orders for this visit:    Malignant neoplasm of upper-outer quadrant of right female breast, unspecified estrogen receptor status ([facility] [address] )  -     CT chest abdomen pelvis wo contrast; Future  -     NM bone scan whole body; Future  -     Ambulatory referral to Hematology / Oncology; Future    Carcinoma of right breast 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 ([facility] [address] )  -     CT chest abdomen pelvis wo contrast; Future  -     NM bone scan whole body; Future  -     Ambulatory referral to Hematology / Oncology; Future      HPI: Tracy Catherine is a 82y o  year old female who presents with a right breast cancer  She shares she noticed her right breast was becoming painful, swollen and firm to touch  She had abnormal breast imaging which led to a right breast biopsy and axillary node biopsy  This revealed an IDC with metastasis to her lymph node  Surgical treatment to date consisted of n/a  Oncology History:     No history exists         Pertinent reproductive history:  Age at menarche:  17  OB History        2    Para   1    Term   1            AB        Living           SAB        TAB        Ectopic        Multiple Live Births               Obstetric Comments   Menarche : 17  Hx of BCP  hysterectomy           Age at first live birth:  34  Age at menopause:  unk  Hysterectomy/Oophrectomy:  Yes  Hormone replacement therapy:  No  Birth control pills:  Yes    Problem List:   Patient Active Problem List   Diagnosis    Osteoarthritis    Osteoporosis    Mixed hyperlipidemia    Essential hypertension    Anxiety    Bilateral hip pain    Skin tag    Acute nonintractable headache    Atypical chest pain    Vertigo    Aortic heart murmur    Breast mass, right    Malignant neoplasm of upper-outer quadrant of right female breast ([address] )     Past Medical History:   Diagnosis Date    Ataxia     last assessed 2/16/2017    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    Murmur     last assessed 5/22/2012    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    Right carotid bruit     last assessed 5/22/2012     Past Surgical History:   Procedure Laterality Date    COLONOSCOPY  2007    TONSILLECTOMY AND ADENOIDECTOMY      TOTAL ABDOMINAL HYSTERECTOMY      US GUIDED BREAST BIOPSY RIGHT COMPLETE Right 2/14/2019    US GUIDED BREAST LYMPH NODE BIOPSY RIGHT Right 2/14/2019     Family History   Problem Relation Age of Onset    No Known Problems Mother     Other Family         CABG    Hypertension Family     Transient ischemic attack Family      Social History     Socioeconomic History    Marital status: Single     Spouse name: Not on file    Number of children: Not on file    Years of education: Not on file    Highest education level: Not on file   Occupational History    Not on file   Social Needs    Financial resource strain: Not on file    Food insecurity:     Worry: Not on file     Inability: Not on file    Transportation needs:     Medical: Not on file     Non-medical: Not on file   Tobacco Use    Smoking status: Former Smoker    Smokeless tobacco: Never Used    Tobacco comment: quit in 1980   Substance and Sexual Activity    Alcohol use: No     Comment: history    Drug use: No    Sexual activity: Not on file   Lifestyle    Physical activity:     Days per week: Not on file     Minutes per session: Not on file    Stress: Not on file   Relationships    Social connections:     Talks on phone: Not on file     Gets together: Not on file     Attends Judaism service: Not on file     Active member of club or organization: Not on file     Attends meetings of clubs or organizations: Not on file     Relationship status: Not on file    Intimate partner violence:     Fear of current or ex partner: Not on file     Emotionally abused: Not on file     Physically abused: Not on file     Forced sexual activity: Not on file   Other Topics Concern    Not on file   Social History Narrative    Denied:  History of housing without smoke detectors    Uses safety equipment - seatbelts     Current Outpatient Medications   Medication Sig Dispense Refill    amLODIPine-benazepril (LOTREL) 10-20 MG per capsule Take 1 capsule by mouth daily 90 capsule 0    aspirin 81 MG tablet Take 1 tablet by mouth daily      Calcium-Magnesium 333-167 MG TABS Take by mouth      Cholecalciferol (VITAMIN D3) 1000 units CAPS Take 400 Units by mouth        hydrALAZINE (APRESOLINE) 25 mg tablet TAKE 1 TABLET BY MOUTH TWICE DAILY 90 tablet 0    LORazepam (ATIVAN) 0 5 mg tablet TAKE 1 TABLET BY MOUTH TWICE DAILY AS NEEDED FOR  ANXIETY 60 tablet 0    LUMIGAN 0 01 % ophthalmic drops       meclizine (ANTIVERT) 25 mg tablet Take 1 tablet (25 mg total) by mouth every 8 (eight) hours as needed for dizziness 50 tablet 0    metoprolol tartrate (LOPRESSOR) 100 mg tablet Take 1 tablet (100 mg total) by mouth every 12 (twelve) hours 180 tablet 3    Multiple Vitamins-Minerals (WOMENS 50+ MULTI VITAMIN/MIN PO) Take by mouth      simvastatin (ZOCOR) 40 mg tablet Take 1 tablet (40 mg total) by mouth daily at bedtime 90 tablet 0     No current facility-administered medications for this visit  Allergies   Allergen Reactions    Hydrocodone-Acetaminophen Dizziness and Fatigue         The following portions of the patient's history were reviewed and updated as appropriate: allergies, current medications, past family history, past medical history, past social history, past surgical history and problem list     Review of Systems:  Review of Systems   Constitutional: Negative  Negative for appetite change and fever  Eyes: Negative  Respiratory: Negative for shortness of breath  Cardiovascular: Negative  Gastrointestinal: Negative  Endocrine: Negative  Genitourinary: Negative  Musculoskeletal: Negative  Negative for arthralgias and myalgias  Skin: Negative  Allergic/Immunologic: Negative  Neurological: Negative  Hematological: Negative  Negative for adenopathy  Does not bruise/bleed easily  Psychiatric/Behavioral: Negative  Physical Exam:  Physical Exam   Constitutional: She is oriented to person, place, and time  She appears well-developed and well-nourished  HENT:   Head: Normocephalic and atraumatic  Cardiovascular: Normal heart sounds  Pulmonary/Chest: Breath sounds normal  Right breast exhibits mass and skin change (edema throughout the breast)  Right breast exhibits no inverted nipple, no nipple discharge and no tenderness  Left breast exhibits no inverted nipple, no mass, no nipple discharge, no skin change and no tenderness  Breasts are asymmetrical    Large mass in the outer portion of the right breast with firmness throughout along with skin thickening/edema       Abdominal: Soft  Lymphadenopathy:     She has axillary adenopathy ( large palpable mass in the right axilla that appears to be mobile)  Right axillary: Pectoral and lateral adenopathy present  Left axillary: No pectoral and no lateral adenopathy present  Right: No supraclavicular adenopathy present  Left: No supraclavicular adenopathy present  Neurological: She is alert and oriented to person, place, and time  Psychiatric: She has a normal mood and affect  Laboratory:  2019 core biopsy of the right breast and axilla    Pathology revealed: invasive ductal carcinoma    Histologic grade: poorly differentiated     Angiolymphatic invasion:  absent    Tumor node status:  Positive    Hormone receptor status:  Pending      [id number]  3D Nathaniel Dx Mammogram right breast US  B5 (3) 3 5 cm spiculated mass with multiple satellite nodules, there are also multiple enlarged axillary nodes, there is diffuse skin thickening as well  19  Right breast biopsy, right axillary biopsy        Discussion/Summary:  74-year-old female who presents today secondary to newly diagnosed carcinoma of the right breast   She has diffuse involvement in the breast and skin as well as involved axillary node  Her hormone receptors are pending  I discussed these findings with the patient and her son who accompanies her today  We reviewed the multimodality treatment of breast cancer to include surgery, medical therapy and radiation  She has at least a locally advanced carcinoma  I am recommending staging CT scans and a bone scan  I am also referring her to Medical Oncology  I will call her with the results of the scans  Further recommendations will be made at that time  If there is no distant disease, she will still likely benefit from neoadjuvant medical therapy given her presentation

## 2019-02-18 NOTE — PROGRESS NOTES
Patient Past Medical History:  HTN, elevated cholesterol, anxiety, cataracts and glaucoma          Allergies:        Past Breast History:     Previous Biopsy:   Yes______ No________      Breast: Right____ Left______       Malignant______ Benign_______      Treatments if applicable        Present Breast History:     Right____x______    Left_____________     Density____x_____    Calcifications____x________   Palpable______________      Patient notified of results on:  2/15/19 by pt's PCP, Dr Crissy Loera    Date of Appointment with Surgeon  2/18/19 with Dr Charla Sung

## 2019-02-18 NOTE — PROGRESS NOTES
Breast Nurse Navigator    Met patient and son during office visit with Dr Frank Ruggiero today 02/18/2019  Completed joint visit with ARUN Fox, Cancer support services discussed  Patient expressed concern to Kyree JOAQUIN in assuring that planned surgery and treatment are approved and covered by insurance, per Leah JOAQUIN, she will reach out to Gina Pizarro,  to call patient with approved/covered services  Son supportive and will assist along with other family and friends with transportation and care as needed  Discussed role of breast nurse navigator throughout treatment, patient and son overwhelmed with information  Offered emotional support and encouragement  I will follow patient closely and also reach out to son to navigate through treatment, testings, questions, concerns and needs  Provided with my contact information and availability

## 2019-02-20 DIAGNOSIS — I10 ESSENTIAL HYPERTENSION: ICD-10-CM

## 2019-02-20 RX ORDER — METOPROLOL SUCCINATE 50 MG/1
TABLET, EXTENDED RELEASE ORAL
Qty: 90 TABLET | Refills: 1 | OUTPATIENT
Start: 2019-02-20

## 2019-02-21 ENCOUNTER — TELEPHONE (OUTPATIENT)
Dept: FAMILY MEDICINE CLINIC | Facility: CLINIC | Age: 84
End: 2019-02-21

## 2019-02-21 NOTE — TELEPHONE ENCOUNTER
Then she will have to increase her hydralazine to 50 mg twice a day  Her blood pressure was too high  She can either try the 100 mg twice a day and see if she tolerates it  Or, she should take 2 of the hydralazine twice a day for several days and make sure she tolerates that before call in the higher dose  Let me know how she is going to proceed

## 2019-02-25 ENCOUNTER — DOCUMENTATION (OUTPATIENT)
Dept: HEMATOLOGY ONCOLOGY | Facility: CLINIC | Age: 84
End: 2019-02-25

## 2019-02-25 NOTE — PROGRESS NOTES
Breast Cancer Nurse Navigator    Breast Working Group 2 25 19 Recommended Physician Plan:     Consider Genetic Testing     Clinical Trial-Yudy SALAS  to screen for [employer] trial    Consider Chemotherapy (neoadjuvant v/s adjuvant )       Pre surgery MRI, if pt can tolerate    Consider Modified Radical Mastectomy if responds to chemo     Review CT C/A/P,  Bone Scan results resched for 2/27/19    Refer to Hematology / Oncology-sched for 3/4/19      Palliative Care Referral

## 2019-03-04 ENCOUNTER — CONSULT (OUTPATIENT)
Dept: HEMATOLOGY ONCOLOGY | Facility: CLINIC | Age: 84
End: 2019-03-04
Payer: COMMERCIAL

## 2019-03-04 VITALS
TEMPERATURE: 96 F | BODY MASS INDEX: 25.4 KG/M2 | DIASTOLIC BLOOD PRESSURE: 70 MMHG | HEART RATE: 53 BPM | WEIGHT: 121 LBS | RESPIRATION RATE: 16 BRPM | SYSTOLIC BLOOD PRESSURE: 110 MMHG | OXYGEN SATURATION: 98 % | HEIGHT: 58 IN

## 2019-03-04 DIAGNOSIS — C50.411 MALIGNANT NEOPLASM OF UPPER-OUTER QUADRANT OF RIGHT FEMALE BREAST, UNSPECIFIED ESTROGEN RECEPTOR STATUS (HCC): ICD-10-CM

## 2019-03-04 DIAGNOSIS — C77.3 CARCINOMA OF RIGHT BREAST METASTATIC TO AXILLARY LYMPH NODE (HCC): ICD-10-CM

## 2019-03-04 DIAGNOSIS — C50.911 CARCINOMA OF RIGHT BREAST METASTATIC TO AXILLARY LYMPH NODE (HCC): ICD-10-CM

## 2019-03-04 PROCEDURE — 99205 OFFICE O/P NEW HI 60 MIN: CPT | Performed by: INTERNAL MEDICINE

## 2019-03-04 NOTE — LETTER
March 4, 2019     Yakelin Bennett MD  [address]     Patient: Betty Villarreal   YOB: 1935   Date of Visit: 3/4/2019       Dear Dr Klein Memory: Thank you for referring Miguel Ángel Members to me for evaluation  Below are my notes for this consultation  If you have questions, please do not hesitate to call me  I look forward to following your patient along with you  Sincerely,        Aron Hong MD        CC: DO Aron Saavedra MD  3/4/2019  2:41 PM  Sign at close encounter  Hematology / Oncology Outpatient Consult Note    Betty Villarreal 82 y o  female [id number]         Date:  3/4/2019    Assessment / Plan:  A 29-year-old postmenopausal woman with newly diagnosed at least locally advanced right breast cancer, grade 3, triple negative disease  She has quite large right breast mass, palpable axillary adenopathy as well as what appeared to be skin involvement, clinically  She presents today to discuss possible neoadjuvant chemotherapy  She is going to have CT scan of chest abdomen pelvis as well as bone scan later this week  At this moment, I would like to wait for this imaging study  If she has no evidence of distant metastasis, I would strongly consider neoadjuvant chemotherapy  Because of her age, I may choose Taxotere and cyclophosphamide  If she has metastatic disease, chemotherapy should be palliative  I may choose Taxine monotherapy  I will see her again in March 11, 2019 to discuss the imaging study and make further recommendation  I asked her to come and see me with her son  She understood  Subjective:     HPI:  A 29-year-old postmenopausal woman who noticed large right breast mass and developed breast pain which she brought to medical attention  She was found to have 3 5 x 2 x 3 0 cm of mass at 9:00 a m  position 5 cm from nipple  She also has palpable right axillary adenopathy   Biopsy from right breast in February 14, 2019 showed invasive ductal carcinoma, grade 3  This was triple negative disease  She was seen by Dr Selina Marie  She is going to have CT scan of chest abdomen pelvis as well as bone scan later this week  She presents today to discuss possible neoadjuvant chemotherapy  She has no other symptomatology  She denied respiratory symptoms such as cough, sputum production or shortness of breast   She has no recent weight loss  She denied any bone pain  She has past medical history of hypertension, arthritis as well as hypercholesteremia  She denied any family history of breast or ovarian cancer  She is a lifetime never smoker  She lived by herself  However, her son lived nearby  Interval History:          Objective:     Primary Diagnosis: At least locally advanced right breast cancer, grade 3, triple negative disease  Diagnosed in February 2019  Cancer Staging:  Cancer Staging  Malignant neoplasm of upper-outer quadrant of right female breast ([facility] [address] )  Staging form: Breast, AJCC 8th Edition  - Clinical: cT2, cN1(f), cM0, G3, ER: Unknown, WA: Unknown, HER2: Unknown - Signed by Angelo Cash MD on 2/18/2019        Previous Hematologic/ Oncologic Treatment:         Current Hematologic/ Oncologic Treatment: To be determined  Disease Status:     Not evaluated at this time  Test Results:    Pathology:    Right breast biopsy showed invasive ductal carcinoma, grade 3  ER negative, WA negative, HER2 negative disease  Radiology:    Mammography and ultrasound shows 3 5 x 2 0 x 3 0 cm of mass at 9:00 a m  position, 5 cm from nipple  Abnormal right axillary adenopathy is noted  Laboratory:    CBC and CMP are within normal limits  Physical Exam:      General Appearance:    Alert, oriented        Eyes:    PERRL   Ears:    Normal external ear canals, both ears   Nose:   Nares normal, septum midline   Throat:   Mucosa moist  Pharynx without injection      Neck:   Supple Lungs:     Clear to auscultation bilaterally   Chest Wall:    No tenderness or deformity    Heart:    Regular rate and rhythm       Abdomen:     Soft, non-tender, bowel sounds +, no organomegaly           Extremities:   Extremities no cyanosis or edema       Skin:   no rash or icterus  Lymph nodes:   Cervical, supraclavicular, and axillary nodes normal   Neurologic:   CNII-XII intact, normal strength, sensation and reflexes     Throughout          Breast exam:   Large right breast mass measuring 10 x 12 cm with edematous skin change  2 cm of palpable right axillary adenopathy  Left breast exam is negative  ROS: Review of Systems   All other systems reviewed and are negative  Imaging: Us Guided Breast Biopsy Right Complete, Us Guided Breast Lymph Node Biopsy Right, Mammo Post Biopsy Right    Addendum Date: 2/18/2019 Addendum:   PATHOLOGY RESULTS: The results of the ultrasound-guided biopsy right breast 09:00 o'clock mass and right axillary lymph node have returned as invasive ductal carcinoma and metastatic carcinoma, respectively  The pathology is malignant and concordant with imaging  In review of the patients recent imaging, the right malignancy appears likely multicentric extending at least 5 5 cm transverse by 5 1 cm craniocaudal   Skin thickening suggest inflammatory carcinoma  Furthermore, additional masses involving pectoralis major were identified on recent ultrasound  Ultrasound of the right axilla on 02/06/2019 showed multiple metastatic lymph nodes, the most inferior of which was biopsied  Recent imaging of the contralateral left breast dated 02/06/2019 was reviewed and shows no suspicious findings  Recommendation is for surgical consultation  Additionally as previously discussed in the biopsy report, breast MRI should be considered to evaluate extent of disease and to delineate muscle involvement   RECOMMENDATION:      - Surgical Consultation for the right breast      Result Date: 2/14/2019  Narrative: DIAGNOSIS:  INDICATION: Randi Thorne is a 82 y o  female presenting ultrasound-guided biopsy right breast mass and abnormal right axillary lymph node  Prior to the procedure, previous imaging was reviewed  I discussed with the patient to the very high likelihood of positive results  Patient has at least multifocal common more likely multicentric right breast carcinoma with skin thickening suggestive of inflammatory carcinoma  On diagnostic ultrasound, there were masses invading pectoralis major as well as multiple enlarged axillary lymph nodes  Although initially multi site right breast biopsy was recommended, I discussed with the patient that only biopsy of the dominant mass and a likely metastatic axillary lymph node would be performed at this time  A 2nd site right breast biopsy would not change immediate clinical management  Furthermore, as suspicious masses are likely multicentric and given  pectoral invasion, breast MRI would already be indicated  The procedure was explained to the patient in detail  All questions were answered  Written and verbal informed consent was obtained  FINDINGS: RIGHT 1) MASS US guided breast biopsy right complete: Images of the right breast mass at 9 o'clock in the middle portion, 5 cm from the nipple were obtained  A core needle biopsy of a hypoechoic mass was performed under ultrasound guidance using a lateral approach  The skin was prepped in the usual fashion  Anesthesia was administered using lidocaine 1%  A 12 G  Marquee needle was used to obtain 2 core samples  A  HydroMARK butterfly clip was inserted into the target area  Post-placement digital mammographic imaging was performed  The patient tolerated the procedure well  There were no immediate complications  5) LYMPH NODE US guided breast biopsy right complete: Images of the right  Axilla were obtained  Numerous  Morphologically abnormal lymph nodes were again identified    The inferior-most lymph node was targeted for biopsy  A core needle biopsy was performed under ultrasound guidance using an inferolateral approach  The skin was prepped in the usual fashion  Anesthesia was administered using lidocaine 1%  A 16 G Marquee needle was used to obtain 2 core samples  A  HydroMARK open coil clip was inserted into the lymph node cortex  Clip placement within lymph node cortex was documented in two orthogonal sonographic projections  This was not able to be seen on post biopsy mammogram      Impression:  1  Uncomplicated ultrasound-guided core needle biopsy right breast 09:00 o'clock mass, with placement of a HydroMARK butterfly clip  2   Uncomplicated ultrasound-guided core needle biopsy right axillary level 1 lymph node, with placement of a HydroMARK open coil clip  3  As discussed above, patient 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, metastatic inflammatory breast carcinoma  Given multiple abnormal masses and invasion of  pectoralis major, breast MRI would likely be recommended assuming results return malignant  RECOMMENDATION:      - Waiting for pathology for the right breast  Workstation ID: [id number]    Us Breast Right Limited (diagnostic), Mammo Diagnostic Bilateral W 3d & Cad    Result Date: 2/6/2019  Narrative: DIAGNOSIS: Breast mass, right RELEVANT HISTORY: Family Breast Cancer History: No known family history of breast cancer  Family Medical history: No relevant family history has been documented for this patient  Personal History: No relevant hormone history has been documented for this patient  Surgical history includes hysterectomy  No relevant medical history has been documented for this patient   COMPARISONS: Compared to: 05/19/2015 MAMMO SCREENING BILATERAL W CAD, 03/31/2014 Mammo screening bilateral w cad, 01/28/2013 MAMMO SCREENING BILATERAL W CAD, 09/27/2011 MAMMO SCREENING BILATERAL W CAD, 08/23/2010 MAMMO SCREENING BILATERAL W CAD, and 08/10/2009 MAMMO SCREENING BILATERAL W CAD INDICATION: Geofm Rubinstein is a 82 y o  female presenting for Right breast mass  TECHNOLOGIST: Gaurav Moulton VIEWS: 2D views: CC views of left, right breast(s) were taken  2D synth views: MLO views of left, right breast(s) were taken  3D views: MLO views of left, right breast(s) were taken  TISSUE DENSITY: The breasts are heterogeneously dense, which may obscure small masses  RISK ASSESSMENT: 5 Year: Tyrer-Cuzick: 0 7 % 10 Year: Tyrer-Cuzick: 0 7 % Lifetime: Tyrer-Cuzick: 0 7 % FINDINGS: RIGHT 1) MASS US breast right limited (diagnostic): There is a 35 mm x 20 mm x 30 mm oval, hypoechoic mass with spiculated margins seen in the right breast at 9 o'clock, 5 cm from the nipple  The mass correlates with a palpable mass reported by the patient  Associated features include skin thickening and internal vascularity  Mammo diagnostic bilateral w 3d & cad: There is a 25 mm irregularly shaped mass with obscured margins seen in the outer central region of the right breast in the middle depth  The mass correlates with a palpable mass reported by the patient  2) MASS US breast right limited (diagnostic): There is an 8 mm x 6 mm x 7 mm round, hypoechoic mass with indistinct margins seen in the right breast at 12 o'clock, 6 cm from the nipple  The mass correlates with tenderness reported by the patient  Associated features include skin thickening and internal vascularity  Mammo diagnostic bilateral w 3d & cad:  Finding not seen on this modality: mass  3) MASS US breast right limited (diagnostic): There is a 7 mm x 6 mm x 10 mm oval, hypoechoic mass with indistinct margins seen in the right breast at 12 o'clock, 7 cm from the nipple  The mass correlates with tenderness reported by the patient  This mass appears to be intramuscular  Mammo diagnostic bilateral w 3d & cad:  Finding not seen on this modality: mass  4) MASS US breast right limited (diagnostic):  There is an 8 mm x 6 mm x 9 mm oval, hypoechoic mass with indistinct margins seen in the right breast at 12 o'clock, 7 cm from the nipple  The mass correlates with tenderness reported by the patient  Associated features include skin thickening  This mass appears to be intramuscular  Mammo diagnostic bilateral w 3d & cad:  Finding not seen on this modality: mass  5) LYMPH NODE US breast right limited (diagnostic): There are multiple similar lymph nodes seen in the right axilla  These are abnormal appearing  Mammo diagnostic bilateral w 3d & cad:  Finding not seen on this modality: lymph node  LEFT There are no suspicious masses, grouped microcalcifications or areas of architectural distortion  The skin and nipple areolar complex are unremarkable  Impression:  Suspicious masses in the right breast with right axillary adenopathy, circumferential right breast skin thickening, whole breast retraction and evidence of pectoralis muscle invasion  ASSESSMENT/BI-RADS CATEGORY: Right: 5 - Highly Suggestive of Malignancy Overall: 5 - Highly Suggestive of Malignancy RECOMMENDATION:      - Biopsy- US guided for the right breast       - Biopsy- US guided for the right breast       - Biopsy- US guided for the right breast  Workstation ID: [id number]        Labs:   Lab Results   Component Value Date    WBC 5 67 12/14/2018    HGB 12 8 12/14/2018    HCT 39 8 12/14/2018    MCV 91 12/14/2018     (H) 12/14/2018     Lab Results   Component Value Date     12/27/2017    K 3 8 12/14/2018     12/14/2018    CO2 28 12/14/2018    ANIONGAP 6 09/08/2015    BUN 16 12/14/2018    CREATININE 0 76 12/14/2018    GLUCOSE 86 09/08/2015    CALCIUM 9 9 12/14/2018    AST 23 12/14/2018    ALT 30 12/14/2018    ALKPHOS 82 12/14/2018    PROT 7 2 12/27/2017    BILITOT 0 4 12/27/2017    EGFR 85 12/14/2018         Lab Results   Component Value Date    SLJMMYML87 1,265 (H) 07/23/2018         Vital Sign:    Body surface area is 1 47 meters squared      Wt Readings from Last 3 Encounters:   03/04/19 54 9 kg (121 lb)   02/18/19 54 kg (119 lb)   02/06/19 54 4 kg (120 lb)        Temp Readings from Last 3 Encounters:   03/04/19 (!) 96 °F (35 6 °C) (Tympanic)   02/18/19 (!) 96 1 °F (35 6 °C) (Tympanic)   12/14/18 98 4 °F (36 9 °C)        BP Readings from Last 3 Encounters:   03/04/19 110/70   02/18/19 140/90   02/14/19 170/74         Pulse Readings from Last 3 Encounters:   03/04/19 (!) 53   02/18/19 76   02/14/19 70     @LASTSAO2(3)@    Active Problems:   Patient Active Problem List   Diagnosis    Osteoarthritis    Osteoporosis    Mixed hyperlipidemia    Essential hypertension    Anxiety    Bilateral hip pain    Skin tag    Acute nonintractable headache    Atypical chest pain    Vertigo    Aortic heart murmur    Malignant neoplasm of upper-outer quadrant of right female breast ([address] )    Carcinoma of right breast 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 (HCC)       Past Medical History:   Past Medical History:   Diagnosis Date    Ataxia     last assessed 2/16/2017    Hypercholesteremia     Hypertension     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    Murmur     last assessed 5/22/2012    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    Right carotid bruit     last assessed 5/22/2012       Surgical History:   Past Surgical History:   Procedure Laterality Date    BREAST BIOPSY Right 02/14/2019    IDC  right axillary biopsy - mets    COLONOSCOPY  2007    TONSILLECTOMY AND ADENOIDECTOMY      TOTAL ABDOMINAL HYSTERECTOMY      US GUIDED BREAST BIOPSY RIGHT COMPLETE Right 2/14/2019    US GUIDED BREAST LYMPH NODE BIOPSY RIGHT Right 2/14/2019       Family History:    Family History   Problem Relation Age of Onset    No Known Problems Mother     Other Family         CABG    Hypertension Family     Transient ischemic attack Family        Cancer-related family history is not on file      Social History:   Social History     Socioeconomic History    Marital status: Single     Spouse name: Not on file    Number of children: Not on file    Years of education: Not on file    Highest education level: Not on file   Occupational History    Not on file   Social Needs    Financial resource strain: Not on file    Food insecurity:     Worry: Not on file     Inability: Not on file    Transportation needs:     Medical: Not on file     Non-medical: Not on file   Tobacco Use    Smoking status: Former Smoker    Smokeless tobacco: Never Used    Tobacco comment: quit in 1980   Substance and Sexual Activity    Alcohol use: No     Comment: history    Drug use: No    Sexual activity: Not on file   Lifestyle    Physical activity:     Days per week: Not on file     Minutes per session: Not on file    Stress: Not on file   Relationships    Social connections:     Talks on phone: Not on file     Gets together: Not on file     Attends [employer] service: Not on file     Active member of club or organization: Not on file     Attends meetings of clubs or organizations: Not on file     Relationship status: Not on file    Intimate partner violence:     Fear of current or ex partner: Not on file     Emotionally abused: Not on file     Physically abused: Not on file     Forced sexual activity: Not on file   Other Topics Concern    Not on file   Social History Narrative    Denied:  History of housing without smoke detectors    Uses safety equipment - seatbelts       Current Medications:   Current Outpatient Medications   Medication Sig Dispense Refill    amLODIPine-benazepril (LOTREL) 10-20 MG per capsule Take 1 capsule by mouth daily 90 capsule 0    aspirin 81 MG tablet Take 1 tablet by mouth daily      Calcium-Magnesium 333-167 MG TABS Take by mouth      Cholecalciferol (VITAMIN D3) 1000 units CAPS Take 400 Units by mouth        hydrALAZINE (APRESOLINE) 25 mg tablet TAKE 1 TABLET BY MOUTH TWICE DAILY 90 tablet 0    LORazepam (ATIVAN) 0 5 mg tablet TAKE 1 TABLET BY MOUTH TWICE DAILY AS NEEDED FOR  ANXIETY 60 tablet 0    LUMIGAN 0 01 % ophthalmic drops       meclizine (ANTIVERT) 25 mg tablet Take 1 tablet (25 mg total) by mouth every 8 (eight) hours as needed for dizziness 50 tablet 0    metoprolol tartrate (LOPRESSOR) 100 mg tablet Take 1 tablet (100 mg total) by mouth every 12 (twelve) hours 180 tablet 3    Multiple Vitamins-Minerals (WOMENS 50+ MULTI VITAMIN/MIN PO) Take by mouth      simvastatin (ZOCOR) 40 mg tablet Take 1 tablet (40 mg total) by mouth daily at bedtime 90 tablet 0     No current facility-administered medications for this visit  Allergies:    Allergies   Allergen Reactions    Hydrocodone-Acetaminophen Dizziness and Fatigue

## 2019-03-04 NOTE — PROGRESS NOTES
Hematology / Oncology Outpatient Consult Note    Ottis Bosworth 82 y o  female [id number]         Date:  3/4/2019    Assessment / Plan:  A 46-year-old postmenopausal woman with newly diagnosed at least locally advanced right breast cancer, grade 3, triple negative disease  She has quite large right breast mass, palpable axillary adenopathy as well as what appeared to be skin involvement, clinically  She presents today to discuss possible neoadjuvant chemotherapy  She is going to have CT scan of chest abdomen pelvis as well as bone scan later this week  At this moment, I would like to wait for this imaging study  If she has no evidence of distant metastasis, I would strongly consider neoadjuvant chemotherapy  Because of her age, I may choose Taxotere and cyclophosphamide  If she has metastatic disease, chemotherapy should be palliative  I may choose Taxine monotherapy  I will see her again in March 11, 2019 to discuss the imaging study and make further recommendation  I asked her to come and see me with her son  She understood  Subjective:     HPI:  A 46-year-old postmenopausal woman who noticed large right breast mass and developed breast pain which she brought to medical attention  She was found to have 3 5 x 2 x 3 0 cm of mass at 9:00 a m  position 5 cm from nipple  She also has palpable right axillary adenopathy  Biopsy from right breast in February 14, 2019 showed invasive ductal carcinoma, grade 3  This was triple negative disease  She was seen by Dr Belkis Jj  She is going to have CT scan of chest abdomen pelvis as well as bone scan later this week  She presents today to discuss possible neoadjuvant chemotherapy  She has no other symptomatology  She denied respiratory symptoms such as cough, sputum production or shortness of breast   She has no recent weight loss  She denied any bone pain   She has past medical history of hypertension, arthritis as well as hypercholesteremia  She denied any family history of breast or ovarian cancer  She is a lifetime never smoker  She lived by herself  However, her son lived nearby  Interval History:          Objective:     Primary Diagnosis: At least locally advanced right breast cancer, grade 3, triple negative disease  Diagnosed in February 2019  Cancer Staging:  Cancer Staging  Malignant neoplasm of upper-outer quadrant of right female breast ([facility] [address] )  Staging form: Breast, AJCC 8th Edition  - Clinical: cT2, cN1(f), cM0, G3, ER: Unknown, CO: Unknown, HER2: Unknown - Signed by Emir Shin MD on 2/18/2019        Previous Hematologic/ Oncologic Treatment:         Current Hematologic/ Oncologic Treatment: To be determined  Disease Status:     Not evaluated at this time  Test Results:    Pathology:    Right breast biopsy showed invasive ductal carcinoma, grade 3  ER negative, CO negative, HER2 negative disease  Radiology:    Mammography and ultrasound shows 3 5 x 2 0 x 3 0 cm of mass at 9:00 a m  position, 5 cm from nipple  Abnormal right axillary adenopathy is noted  Laboratory:    CBC and CMP are within normal limits  Physical Exam:      General Appearance:    Alert, oriented        Eyes:    PERRL   Ears:    Normal external ear canals, both ears   Nose:   Nares normal, septum midline   Throat:   Mucosa moist  Pharynx without injection  Neck:   Supple       Lungs:     Clear to auscultation bilaterally   Chest Wall:    No tenderness or deformity    Heart:    Regular rate and rhythm       Abdomen:     Soft, non-tender, bowel sounds +, no organomegaly           Extremities:   Extremities no cyanosis or edema       Skin:   no rash or icterus  Lymph nodes:   Cervical, supraclavicular, and axillary nodes normal   Neurologic:   CNII-XII intact, normal strength, sensation and reflexes     Throughout          Breast exam:   Large right breast mass measuring 10 x 12 cm with edematous skin change    2 cm of palpable right axillary adenopathy  Left breast exam is negative  ROS: Review of Systems   All other systems reviewed and are negative  Imaging: Us Guided Breast Biopsy Right Complete, Us Guided Breast Lymph Node Biopsy Right, Mammo Post Biopsy Right    Addendum Date: 2/18/2019 Addendum:   PATHOLOGY RESULTS: The results of the ultrasound-guided biopsy right breast 09:00 o'clock mass and right axillary lymph node have returned as invasive ductal carcinoma and metastatic carcinoma, respectively  The pathology is malignant and concordant with imaging  In review of the patients recent imaging, the right malignancy appears likely multicentric extending at least 5 5 cm transverse by 5 1 cm craniocaudal   Skin thickening suggest inflammatory carcinoma  Furthermore, additional masses involving pectoralis major were identified on recent ultrasound  Ultrasound of the right axilla on 02/06/2019 showed multiple metastatic lymph nodes, the most inferior of which was biopsied  Recent imaging of the contralateral left breast dated 02/06/2019 was reviewed and shows no suspicious findings  Recommendation is for surgical consultation  Additionally as previously discussed in the biopsy report, breast MRI should be considered to evaluate extent of disease and to delineate muscle involvement  RECOMMENDATION:      - Surgical Consultation for the right breast      Result Date: 2/14/2019  Narrative: DIAGNOSIS:  INDICATION: Michelle Orellana is a 82 y o  female presenting ultrasound-guided biopsy right breast mass and abnormal right axillary lymph node  Prior to the procedure, previous imaging was reviewed  I discussed with the patient to the very high likelihood of positive results  Patient has at least multifocal common more likely multicentric right breast carcinoma with skin thickening suggestive of inflammatory carcinoma    On diagnostic ultrasound, there were masses invading pectoralis major as well as multiple enlarged axillary lymph nodes  Although initially multi site right breast biopsy was recommended, I discussed with the patient that only biopsy of the dominant mass and a likely metastatic axillary lymph node would be performed at this time  A 2nd site right breast biopsy would not change immediate clinical management  Furthermore, as suspicious masses are likely multicentric and given  pectoral invasion, breast MRI would already be indicated  The procedure was explained to the patient in detail  All questions were answered  Written and verbal informed consent was obtained  FINDINGS: RIGHT 1) MASS US guided breast biopsy right complete: Images of the right breast mass at 9 o'clock in the middle portion, 5 cm from the nipple were obtained  A core needle biopsy of a hypoechoic mass was performed under ultrasound guidance using a lateral approach  The skin was prepped in the usual fashion  Anesthesia was administered using lidocaine 1%  A 12 G  Marquee needle was used to obtain 2 core samples  A  HydroMARK butterfly clip was inserted into the target area  Post-placement digital mammographic imaging was performed  The patient tolerated the procedure well  There were no immediate complications  5) LYMPH NODE US guided breast biopsy right complete: Images of the right  Axilla were obtained  Numerous  Morphologically abnormal lymph nodes were again identified  The inferior-most lymph node was targeted for biopsy  A core needle biopsy was performed under ultrasound guidance using an inferolateral approach  The skin was prepped in the usual fashion  Anesthesia was administered using lidocaine 1%  A 16 G Marquee needle was used to obtain 2 core samples  A  HydroMARK open coil clip was inserted into the lymph node cortex  Clip placement within lymph node cortex was documented in two orthogonal sonographic projections  This was not able to be seen on post biopsy mammogram      Impression:  1      Uncomplicated ultrasound-guided core needle biopsy right breast 09:00 o'clock mass, with placement of a HydroMARK butterfly clip  2   Uncomplicated ultrasound-guided core needle biopsy right axillary level 1 lymph node, with placement of a HydroMARK open coil clip  3  As discussed above, patient 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, metastatic inflammatory breast carcinoma  Given multiple abnormal masses and invasion of  pectoralis major, breast MRI would likely be recommended assuming results return malignant  RECOMMENDATION:      - Waiting for pathology for the right breast  Workstation ID: [id number]    Us Breast Right Limited (diagnostic), Mammo Diagnostic Bilateral W 3d & Cad    Result Date: 2/6/2019  Narrative: DIAGNOSIS: Breast mass, right RELEVANT HISTORY: Family Breast Cancer History: No known family history of breast cancer  Family Medical history: No relevant family history has been documented for this patient  Personal History: No relevant hormone history has been documented for this patient  Surgical history includes hysterectomy  No relevant medical history has been documented for this patient  COMPARISONS: Compared to: 05/19/2015 MAMMO SCREENING BILATERAL W CAD, 03/31/2014 Mammo screening bilateral w cad, 01/28/2013 MAMMO SCREENING BILATERAL W CAD, 09/27/2011 MAMMO SCREENING BILATERAL W CAD, 08/23/2010 MAMMO SCREENING BILATERAL W CAD, and 08/10/2009 MAMMO SCREENING BILATERAL W CAD INDICATION: Zackery Mcdaniel is a 82 y o  female presenting for Right breast mass  TECHNOLOGIST: Vivian Kaplan VIEWS: 2D views: CC views of left, right breast(s) were taken  2D synth views: MLO views of left, right breast(s) were taken  3D views: MLO views of left, right breast(s) were taken  TISSUE DENSITY: The breasts are heterogeneously dense, which may obscure small masses   RISK ASSESSMENT: 5 Year: Tyrer-Cuzick: 0 7 % 10 Year: Tyrer-Cuzick: 0 7 % Lifetime: Tyrer-Cuzick: 0 7 % FINDINGS: RIGHT 1) MASS US breast right limited (diagnostic): There is a 35 mm x 20 mm x 30 mm oval, hypoechoic mass with spiculated margins seen in the right breast at 9 o'clock, 5 cm from the nipple  The mass correlates with a palpable mass reported by the patient  Associated features include skin thickening and internal vascularity  Mammo diagnostic bilateral w 3d & cad: There is a 25 mm irregularly shaped mass with obscured margins seen in the outer central region of the right breast in the middle depth  The mass correlates with a palpable mass reported by the patient  2) MASS US breast right limited (diagnostic): There is an 8 mm x 6 mm x 7 mm round, hypoechoic mass with indistinct margins seen in the right breast at 12 o'clock, 6 cm from the nipple  The mass correlates with tenderness reported by the patient  Associated features include skin thickening and internal vascularity  Mammo diagnostic bilateral w 3d & cad:  Finding not seen on this modality: mass  3) MASS US breast right limited (diagnostic): There is a 7 mm x 6 mm x 10 mm oval, hypoechoic mass with indistinct margins seen in the right breast at 12 o'clock, 7 cm from the nipple  The mass correlates with tenderness reported by the patient  This mass appears to be intramuscular  Mammo diagnostic bilateral w 3d & cad:  Finding not seen on this modality: mass  4) MASS US breast right limited (diagnostic): There is an 8 mm x 6 mm x 9 mm oval, hypoechoic mass with indistinct margins seen in the right breast at 12 o'clock, 7 cm from the nipple  The mass correlates with tenderness reported by the patient  Associated features include skin thickening  This mass appears to be intramuscular  Mammo diagnostic bilateral w 3d & cad:  Finding not seen on this modality: mass  5) LYMPH NODE US breast right limited (diagnostic): There are multiple similar lymph nodes seen in the right axilla  These are abnormal appearing  Mammo diagnostic bilateral w 3d & cad:  Finding not seen on this modality: lymph node   LEFT There are no suspicious masses, grouped microcalcifications or areas of architectural distortion  The skin and nipple areolar complex are unremarkable  Impression:  Suspicious masses in the right breast with right axillary adenopathy, circumferential right breast skin thickening, whole breast retraction and evidence of pectoralis muscle invasion  ASSESSMENT/BI-RADS CATEGORY: Right: 5 - Highly Suggestive of Malignancy Overall: 5 - Highly Suggestive of Malignancy RECOMMENDATION:      - Biopsy- US guided for the right breast       - Biopsy- US guided for the right breast       - Biopsy- US guided for the right breast  Workstation ID: [id number]        Labs:   Lab Results   Component Value Date    WBC 5 67 12/14/2018    HGB 12 8 12/14/2018    HCT 39 8 12/14/2018    MCV 91 12/14/2018     (H) 12/14/2018     Lab Results   Component Value Date     12/27/2017    K 3 8 12/14/2018     12/14/2018    CO2 28 12/14/2018    ANIONGAP 6 09/08/2015    BUN 16 12/14/2018    CREATININE 0 76 12/14/2018    GLUCOSE 86 09/08/2015    CALCIUM 9 9 12/14/2018    AST 23 12/14/2018    ALT 30 12/14/2018    ALKPHOS 82 12/14/2018    PROT 7 2 12/27/2017    BILITOT 0 4 12/27/2017    EGFR 85 12/14/2018         Lab Results   Component Value Date    OUKRDFMM97 1,265 (H) 07/23/2018         Vital Sign:    Body surface area is 1 47 meters squared      Wt Readings from Last 3 Encounters:   03/04/19 54 9 kg (121 lb)   02/18/19 54 kg (119 lb)   02/06/19 54 4 kg (120 lb)        Temp Readings from Last 3 Encounters:   03/04/19 (!) 96 °F (35 6 °C) (Tympanic)   02/18/19 (!) 96 1 °F (35 6 °C) (Tympanic)   12/14/18 98 4 °F (36 9 °C)        BP Readings from Last 3 Encounters:   03/04/19 110/70   02/18/19 140/90   02/14/19 170/74         Pulse Readings from Last 3 Encounters:   03/04/19 (!) 53   02/18/19 76   02/14/19 70     @LASTSAO2(3)@    Active Problems:   Patient Active Problem List   Diagnosis    Osteoarthritis    Osteoporosis    Mixed hyperlipidemia    Essential hypertension    Anxiety    Bilateral hip pain    Skin tag    Acute nonintractable headache    Atypical chest pain    Vertigo    Aortic heart murmur    Malignant neoplasm of upper-outer quadrant of right female breast (HCC)    Carcinoma of right breast 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 [facility])       Past Medical History:   Past Medical History:   Diagnosis Date    Ataxia     last assessed 2/16/2017    Hypercholesteremia     Hypertension     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    Murmur     last assessed 5/22/2012    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    Right carotid bruit     last assessed 5/22/2012       Surgical History:   Past Surgical History:   Procedure Laterality Date    BREAST BIOPSY Right 02/14/2019    IDC  right axillary biopsy - mets    COLONOSCOPY  2007    TONSILLECTOMY AND ADENOIDECTOMY      TOTAL ABDOMINAL HYSTERECTOMY      US GUIDED BREAST BIOPSY RIGHT COMPLETE Right 2/14/2019    US GUIDED BREAST LYMPH NODE BIOPSY RIGHT Right 2/14/2019       Family History:    Family History   Problem Relation Age of Onset    No Known Problems Mother     Other Family         CABG    Hypertension Family     Transient ischemic attack Family        Cancer-related family history is not on file  Social History:   Social History     Socioeconomic History    Marital status: Single     Spouse name: Not on file    Number of children: Not on file    Years of education: Not on file    Highest education level: Not on file   Occupational History    Not on file   Social Needs    Financial resource strain: Not on file    Food insecurity:     Worry: Not on file     Inability: Not on file    Transportation needs:     Medical: Not on file     Non-medical: Not on file   Tobacco Use    Smoking status: Former Smoker    Smokeless tobacco: Never Used    Tobacco comment: quit in 1980   Substance and Sexual Activity    Alcohol use: No     Comment: history    Drug use:  No  Sexual activity: Not on file   Lifestyle    Physical activity:     Days per week: Not on file     Minutes per session: Not on file    Stress: Not on file   Relationships    Social connections:     Talks on phone: Not on file     Gets together: Not on file     Attends Anabaptism service: Not on file     Active member of club or organization: Not on file     Attends meetings of clubs or organizations: Not on file     Relationship status: Not on file    Intimate partner violence:     Fear of current or ex partner: Not on file     Emotionally abused: Not on file     Physically abused: Not on file     Forced sexual activity: Not on file   Other Topics Concern    Not on file   Social History Narrative    Denied:  History of housing without smoke detectors    Uses safety equipment - seatbelts       Current Medications:   Current Outpatient Medications   Medication Sig Dispense Refill    amLODIPine-benazepril (LOTREL) 10-20 MG per capsule Take 1 capsule by mouth daily 90 capsule 0    aspirin 81 MG tablet Take 1 tablet by mouth daily      Calcium-Magnesium 333-167 MG TABS Take by mouth      Cholecalciferol (VITAMIN D3) 1000 units CAPS Take 400 Units by mouth        hydrALAZINE (APRESOLINE) 25 mg tablet TAKE 1 TABLET BY MOUTH TWICE DAILY 90 tablet 0    LORazepam (ATIVAN) 0 5 mg tablet TAKE 1 TABLET BY MOUTH TWICE DAILY AS NEEDED FOR  ANXIETY 60 tablet 0    LUMIGAN 0 01 % ophthalmic drops       meclizine (ANTIVERT) 25 mg tablet Take 1 tablet (25 mg total) by mouth every 8 (eight) hours as needed for dizziness 50 tablet 0    metoprolol tartrate (LOPRESSOR) 100 mg tablet Take 1 tablet (100 mg total) by mouth every 12 (twelve) hours 180 tablet 3    Multiple Vitamins-Minerals (WOMENS 50+ MULTI VITAMIN/MIN PO) Take by mouth      simvastatin (ZOCOR) 40 mg tablet Take 1 tablet (40 mg total) by mouth daily at bedtime 90 tablet 0     No current facility-administered medications for this visit          Allergies: Allergies   Allergen Reactions    Hydrocodone-Acetaminophen Dizziness and Fatigue

## 2019-03-05 DIAGNOSIS — I10 ESSENTIAL HYPERTENSION: ICD-10-CM

## 2019-03-05 RX ORDER — HYDRALAZINE HYDROCHLORIDE 25 MG/1
TABLET, FILM COATED ORAL
Qty: 90 TABLET | Refills: 0 | Status: SHIPPED | OUTPATIENT
Start: 2019-03-05 | End: 2019-04-22 | Stop reason: SDUPTHER

## 2019-03-07 ENCOUNTER — TELEPHONE (OUTPATIENT)
Dept: SURGICAL ONCOLOGY | Facility: CLINIC | Age: 84
End: 2019-03-07

## 2019-03-07 NOTE — TELEPHONE ENCOUNTER
Breast Nurse Navigator    Called and spoke to patient following up for any questions, concerns or needs at this time  Discussed appointment times for CT and bone scans scheduled for tomorrow 03/08/2019  Instructed on when to stop eating 3 hours prior to procedure time  Can continue to drink clear liquids after that  Instructed patient to drink 1 bottle tonight before bedtime and then start second bottle tomorrow morning at 10:30 1 hour prior to procedure drink half the bottle, bring the second half of the bottle with her to the appointment at which time she will drink the second half  Instructed patient on possible diarrhea or constipation, encouraged to drink as much fluid as possible the day of testing and for an additional 48 hours to clear contrast   Patient encouraged to continue to hydrate as much as possible as she actively has difficulty with adequate hydration  Patient aware of upcoming appointment next week 03/14/19 with Dr Gautam Chisholm, son is trying to take half day to accompany her for appointment, otherwise she will find someone else to come with her  I asked patient if she would be okay with me being present for visit as well, patient agreeable and welcoming to my being present  I will meet patient for appointment with Dr Gautam Chisholm as scheduled

## 2019-03-08 ENCOUNTER — HOSPITAL ENCOUNTER (OUTPATIENT)
Dept: RADIOLOGY | Facility: HOSPITAL | Age: 84
Discharge: HOME | End: 2019-03-08
Attending: SURGERY
Payer: COMMERCIAL

## 2019-03-08 ENCOUNTER — HOSPITAL ENCOUNTER (OUTPATIENT)
Dept: CT IMAGING | Facility: HOSPITAL | Age: 84
Discharge: HOME | End: 2019-03-08
Attending: SURGERY
Payer: COMMERCIAL

## 2019-03-08 DIAGNOSIS — C50.911 CARCINOMA OF RIGHT BREAST METASTATIC TO AXILLARY LYMPH NODE (HCC): ICD-10-CM

## 2019-03-08 DIAGNOSIS — C77.3 CARCINOMA OF RIGHT BREAST METASTATIC TO AXILLARY LYMPH NODE (HCC): ICD-10-CM

## 2019-03-08 DIAGNOSIS — C50.411 MALIGNANT NEOPLASM OF UPPER-OUTER QUADRANT OF RIGHT FEMALE BREAST, UNSPECIFIED ESTROGEN RECEPTOR STATUS (HCC): ICD-10-CM

## 2019-03-08 PROCEDURE — 78306 BONE IMAGING WHOLE BODY: CPT

## 2019-03-08 PROCEDURE — 74176 CT ABD & PELVIS W/O CONTRAST: CPT

## 2019-03-08 PROCEDURE — A9503 TC99M MEDRONATE: HCPCS

## 2019-03-08 PROCEDURE — 71250 CT THORAX DX C-: CPT

## 2019-03-13 ENCOUNTER — TELEPHONE (OUTPATIENT)
Dept: SURGICAL ONCOLOGY | Facility: CLINIC | Age: 84
End: 2019-03-13

## 2019-03-13 DIAGNOSIS — F41.1 GENERALIZED ANXIETY DISORDER: ICD-10-CM

## 2019-03-13 RX ORDER — LORAZEPAM 0.5 MG/1
TABLET ORAL
Qty: 60 TABLET | Refills: 0 | Status: SHIPPED | OUTPATIENT
Start: 2019-03-13 | End: 2019-04-15 | Stop reason: SDUPTHER

## 2019-03-13 NOTE — TELEPHONE ENCOUNTER
Breast Cancer Nurse Navigator      Called patient, no answer, left voicemail message with reminder for scheduled appointment 03/14/19 at 2 p m  With Dr Jacqueline Thrasher  Advised that I will meet her in Medical Oncology lobby to accompany for appointment as requested  Requested return call with any questions

## 2019-03-14 ENCOUNTER — OFFICE VISIT (OUTPATIENT)
Dept: HEMATOLOGY ONCOLOGY | Facility: CLINIC | Age: 84
End: 2019-03-14
Payer: COMMERCIAL

## 2019-03-14 ENCOUNTER — DOCUMENTATION (OUTPATIENT)
Dept: SURGICAL ONCOLOGY | Facility: CLINIC | Age: 84
End: 2019-03-14

## 2019-03-14 VITALS
WEIGHT: 120 LBS | TEMPERATURE: 95.9 F | OXYGEN SATURATION: 98 % | SYSTOLIC BLOOD PRESSURE: 148 MMHG | HEIGHT: 58 IN | RESPIRATION RATE: 16 BRPM | BODY MASS INDEX: 25.19 KG/M2 | HEART RATE: 52 BPM | DIASTOLIC BLOOD PRESSURE: 72 MMHG

## 2019-03-14 DIAGNOSIS — Z17.1 MALIGNANT NEOPLASM OF UPPER-OUTER QUADRANT OF RIGHT BREAST IN FEMALE, ESTROGEN RECEPTOR NEGATIVE (HCC): ICD-10-CM

## 2019-03-14 DIAGNOSIS — C77.3 CARCINOMA OF RIGHT BREAST METASTATIC TO AXILLARY LYMPH NODE (HCC): Primary | ICD-10-CM

## 2019-03-14 DIAGNOSIS — C50.411 MALIGNANT NEOPLASM OF UPPER-OUTER QUADRANT OF RIGHT BREAST IN FEMALE, ESTROGEN RECEPTOR NEGATIVE (HCC): ICD-10-CM

## 2019-03-14 DIAGNOSIS — C50.911 CARCINOMA OF RIGHT BREAST METASTATIC TO AXILLARY LYMPH NODE (HCC): Primary | ICD-10-CM

## 2019-03-14 PROCEDURE — 99215 OFFICE O/P EST HI 40 MIN: CPT | Performed by: INTERNAL MEDICINE

## 2019-03-14 RX ORDER — METOCLOPRAMIDE 10 MG/1
10 TABLET ORAL 4 TIMES DAILY
Qty: 30 TABLET | Refills: 1 | Status: SHIPPED | OUTPATIENT
Start: 2019-03-14 | End: 2019-05-20

## 2019-03-14 NOTE — LETTER
2019     Mauge Mary, DO  [facility]  [address] [place] [address]    Patient: Felicita Tolentino   YOB: 1935   Date of Visit: 3/14/2019       Dear Dr Cy Ramos: Thank you for referring Grace Contreras to me for evaluation  Below are my notes for this consultation  If you have questions, please do not hesitate to call me  I look forward to following your patient along with you  Sincerely,        Sumi Pitts MD        CC: MD Sumi Herron MD  3/14/2019  2:50 PM  Sign at close encounter  Hematology / Oncology Outpatient Follow Up Note    Felicita Tolentino 82 y o  female :1935 [facility] [place]:[id number]         Date:  3/14/2019    Assessment / Plan:  A 82-year-old postmenopausal woman with newly diagnosed at least locally advanced right breast cancer, grade 3, triple negative disease  She has quite large right breast mass, palpable axillary adenopathy as well as what appeared to be skin involvement  She has no evidence of distant metastasis based on the CT scan and bone scan  She presents today with her son to discuss treatment options  We had extensive discussion regarding the diagnosis, locally advanced nature of disease, aggressive triple negative phenotype, high risk disease, potentially curable condition as well as treatment options  With her stage as well as triple negative phenotype, mastectomy by self is highly unlikely to cure her condition  Despite her age, neoadjuvant chemotherapy should be considered  I recommended her to have Taxotere and cyclophosphamide with Neulasta support every 3 weeks times 4-6 cycle  Side effects of this regimen was thoroughly discussed, including but not limited to alopecia, nausea, vomiting, neutropenia, risk of infection, allergic reaction, neuropathy, peripheral edema as well as small chance of treatment related mortality is  She and her son understood and wished to proceed    After the neoadjuvant chemotherapy, she will need mastectomy and axillary lymph node dissection as well as adjuvant postmastectomy radiation therapy  She is going to start 1st cycle of neoadjuvant chemotherapy in March 21, 2019 followed by Neulasta support on day 2  Will see her again in April 1, 2019 with CBC and CMP to evaluate marrow toxicity  All the patient and her son's questions were answered to their satisfaction                                                                                                              Subjective:      HPI:  A 82year-old postmenopausal woman who noticed large right breast mass and developed breast pain which she brought to medical attention  She was found to have 3 5 x 2 x 3 0 cm of mass at 9:00 a m  position 5 cm from nipple  She also has palpable right axillary adenopathy  Biopsy from right breast in February 14, 2019 showed invasive ductal carcinoma, grade 3  This was triple negative disease  She was seen by Dr Sarah Paige  She is going to have CT scan of chest abdomen pelvis as well as bone scan later this week  She presents today to discuss possible neoadjuvant chemotherapy  She has no other symptomatology  She denied respiratory symptoms such as cough, sputum production or shortness of breast   She has no recent weight loss  She denied any bone pain  She has past medical history of hypertension, arthritis as well as hypercholesteremia  She denied any family history of breast or ovarian cancer  She is a lifetime never smoker  She lived by herself  However, her son lived nearby            Interval History:   A 73-year-old postmenopausal woman with newly diagnosed locally advanced right breast cancer, grade 3, triple negative disease  She has large right breast mass, skin involvement as well as known right axillary adenopathy  She recently underwent CT scan of chest abdomen pelvis as well as bone scan both of which showed no evidence of distant metastasis    She presents today with her son to discuss neoadjuvant chemotherapy  She has no complaint of pain  Her weight is stable  She has no respiratory symptoms  She lived by herself  Her performance status is normal            Objective:      Primary Diagnosis:     At least locally advanced right breast cancer, grade 3, triple negative disease  Diagnosed in February 2019      Cancer Staging:  Cancer Staging  Malignant neoplasm of upper-outer quadrant of right female breast ([facility] [address] )  Staging form: Breast, AJCC 8th Edition  - Clinical: cT2, cN1(f), cM0, G3, ER: Unknown, OR: Unknown, HER2: Unknown - Signed by Kimberly Matute MD on 2/18/2019           Previous Hematologic/ Oncologic Treatment:            Current Hematologic/ Oncologic Treatment:       Neoadjuvant chemotherapy with Taxotere and cyclophosphamide times 4-6 cycle  First cycle to be started in March 21, 2019      Disease Status:      Not evaluated at this time      Test Results:     Pathology:     Right breast biopsy showed invasive ductal carcinoma, grade 3  ER negative, OR negative, HER2 negative disease      Radiology:     Mammography and ultrasound shows 3 5 x 2 0 x 3 0 cm of mass at 9:00 a m  position, 5 cm from nipple  Abnormal right axillary adenopathy is noted  CT scan of chest abdomen pelvis showed known large right breast mass and right axillary adenopathy  No evidence of distant metastasis  Bone scan showed no evidence of osseous metastasis      Laboratory:     CBC and CMP are within normal limits      Physical Exam:        General Appearance:    Alert, oriented          Eyes:    PERRL   Ears:    Normal external ear canals, both ears   Nose:   Nares normal, septum midline   Throat:   Mucosa moist  Pharynx without injection      Neck:   Supple         Lungs:     Clear to auscultation bilaterally   Chest Wall:    No tenderness or deformity    Heart:    Regular rate and rhythm         Abdomen:     Soft, non-tender, bowel sounds +, no organomegaly             Extremities:   Extremities no cyanosis or edema         Skin:   no rash or icterus  Lymph nodes:   Cervical, supraclavicular, and axillary nodes normal   Neurologic:   CNII-XII intact, normal strength, sensation and reflexes     Throughout             Breast exam:   Large right breast mass measuring 10 x 12 cm with edematous skin change  2 cm of palpable right axillary adenopathy  Left breast exam is negative                 ROS: Review of Systems   All other systems reviewed and are negative  Imaging: Ct Chest Abdomen Pelvis Wo Contrast    Result Date: 3/13/2019  Narrative: CT CHEST, ABDOMEN AND PELVIS WITHOUT IV CONTRAST INDICATION:   C50 411: Malignant neoplasm of upper-outer quadrant of right female breast C50 911: Malignant neoplasm of unspecified site of right female breast C77 3: Secondary and unspecified malignant neoplasm of axilla and upper limb lymph nodes  COMPARISON:  3/8/2019 TECHNIQUE: CT examination of the chest, abdomen and pelvis was performed without intravenous contrast   Axial, sagittal, and coronal 2D reformatted images were created from the source data and submitted for interpretation  Radiation dose length product (DLP) for this visit:  232 mGy-cm   This examination, like all CT scans performed in the [facility], was performed utilizing techniques to minimize radiation dose exposure, including the use of iterative reconstruction and automated exposure control  Enteric contrast was administered  No intravenous contrast was administered as per the ordering physician  The lack of intravenous contrast diminishes sensitivity for detecting metastatic disease  FINDINGS: CHEST LUNGS:  Mild scattered strandy densities noted in the right middle lobe and both lower lobes as well as slightly in the lingula noted likely subsegmental atelectasis and/or scarring  No rounded nodular mass to confirm pulmonary metastasis  PLEURA:  Unremarkable   HEART/GREAT VESSELS:  Mild atherosclerotic aortic calcifications noted  MEDIASTINUM AND MAIN:  Unremarkable  CHEST WALL AND LOWER NECK:   Markedly abnormal right breast with marked skin thickening  Additionally in the outer aspect of the right breast there is asymmetric increased spiculated radiodensity  The right pectoralis muscle is slightly thickened with fluid and edema in the right breast subcutaneous fat tracking inferiorly into the upper abdominal wall  Imaging features are suggestive of peau d'orange type changes  Bulky right axillary lymphadenopathy noted  ABDOMEN LIVER/BILIARY TREE:  Unremarkable  GALLBLADDER:  No calcified gallstones  No pericholecystic inflammatory change  SPLEEN:  Unremarkable  PANCREAS:  Unremarkable  ADRENAL GLANDS:  Unremarkable  KIDNEYS/URETERS:  Unremarkable  No hydronephrosis  STOMACH AND BOWEL:  Unremarkable  APPENDIX:  No findings to suggest appendicitis  ABDOMINOPELVIC CAVITY:  No ascites or free intraperitoneal air  No lymphadenopathy  VESSELS:  Unremarkable for patient's age  PELVIS REPRODUCTIVE ORGANS:  Unremarkable for patient's age  URINARY BLADDER:  Unremarkable  ABDOMINAL WALL/INGUINAL REGIONS:  Unremarkable  OSSEOUS STRUCTURES:  Moderate levoscoliosis mid lumbar spine advanced degenerative changes of both hips  Scattered multilevel spondylosis  Impression: 1  Imaging features of right-sided inflammatory breast cancer with metastatic axillary lymphadenopathy, correlating to the clinical history  2   No definite evidence of metastatic disease in the abdomen and pelvis on this noncontrast CT scan  Workstation performed: [id number]     Nm Bone Scan Whole Body    Result Date: 3/12/2019  Narrative: BONE SCAN  WHOLE BODY INDICATION: New diagnosis of breast cancer   C50 411: Malignant neoplasm of upper-outer quadrant of right female breast C50 911: Malignant neoplasm of unspecified site of right female breast C77 3: Secondary and unspecified malignant neoplasm of axilla and upper limb lymph nodes PREVIOUS FILM CORRELATION:    CT chest, abdomen and pelvis 3/8/2019 TECHNIQUE:   This study was performed following the intravenous administration of 25 1 mCi Tc-99m labeled MDP  Delayed, anterior and posterior whole body images were acquired, 2-3 hours after radiopharmaceutical administration  Additional delayed static images acquired of the pelvis  FINDINGS:  Scattered foci of radiotracer uptake in the lumbar spine on the right corresponding degenerative changes on CT  Foci of radiotracer uptake at the L5 level bilaterally slightly more intense on the left  This corresponds to facet arthritic changes on CT  Scattered foci of radiotracer uptake at the bilateral shoulders and knees likely related to arthritic changes given the distribution  Renal activity is fairly symmetric  Mild soft tissue uptake in the right breast which may be related to recent procedure  There is no scintigraphic evidence of osseous metastasis  Impression: 1  No scintigraphic evidence of osseous metastasis  Workstation performed: [id number]     Us Guided Breast Biopsy Right Complete, Us Guided Breast Lymph Node Biopsy Right, Mammo Post Biopsy Right    Addendum Date: 2/18/2019 Addendum:   PATHOLOGY RESULTS: The results of the ultrasound-guided biopsy right breast 09:00 o'clock mass and right axillary lymph node have returned as invasive ductal carcinoma and metastatic carcinoma, respectively  The pathology is malignant and concordant with imaging  In review of the patients recent imaging, the right malignancy appears likely multicentric extending at least 5 5 cm transverse by 5 1 cm craniocaudal   Skin thickening suggest inflammatory carcinoma  Furthermore, additional masses involving pectoralis major were identified on recent ultrasound  Ultrasound of the right axilla on 02/06/2019 showed multiple metastatic lymph nodes, the most inferior of which was biopsied   Recent imaging of the contralateral left breast dated 02/06/2019 was reviewed and shows no suspicious findings  Recommendation is for surgical consultation  Additionally as previously discussed in the biopsy report, breast MRI should be considered to evaluate extent of disease and to delineate muscle involvement  RECOMMENDATION:      - Surgical Consultation for the right breast      Result Date: 2/14/2019  Narrative: DIAGNOSIS:  INDICATION: Jamee Jamison is a 82 y o  female presenting ultrasound-guided biopsy right breast mass and abnormal right axillary lymph node  Prior to the procedure, previous imaging was reviewed  I discussed with the patient to the very high likelihood of positive results  Patient has at least multifocal common more likely multicentric right breast carcinoma with skin thickening suggestive of inflammatory carcinoma  On diagnostic ultrasound, there were masses invading pectoralis major as well as multiple enlarged axillary lymph nodes  Although initially multi site right breast biopsy was recommended, I discussed with the patient that only biopsy of the dominant mass and a likely metastatic axillary lymph node would be performed at this time  A 2nd site right breast biopsy would not change immediate clinical management  Furthermore, as suspicious masses are likely multicentric and given  pectoral invasion, breast MRI would already be indicated  The procedure was explained to the patient in detail  All questions were answered  Written and verbal informed consent was obtained  FINDINGS: RIGHT 1) MASS US guided breast biopsy right complete: Images of the right breast mass at 9 o'clock in the middle portion, 5 cm from the nipple were obtained  A core needle biopsy of a hypoechoic mass was performed under ultrasound guidance using a lateral approach  The skin was prepped in the usual fashion  Anesthesia was administered using lidocaine 1%  A 12 G  Marquee needle was used to obtain 2 core samples   A  HydroMARK butterfly clip was inserted into the target area  Post-placement digital mammographic imaging was performed  The patient tolerated the procedure well  There were no immediate complications  5) LYMPH NODE US guided breast biopsy right complete: Images of the right  Axilla were obtained  Numerous  Morphologically abnormal lymph nodes were again identified  The inferior-most lymph node was targeted for biopsy  A core needle biopsy was performed under ultrasound guidance using an inferolateral approach  The skin was prepped in the usual fashion  Anesthesia was administered using lidocaine 1%  A 16 G Marquee needle was used to obtain 2 core samples  A  HydroMARK open coil clip was inserted into the lymph node cortex  Clip placement within lymph node cortex was documented in two orthogonal sonographic projections  This was not able to be seen on post biopsy mammogram      Impression:  1  Uncomplicated ultrasound-guided core needle biopsy right breast 09:00 o'clock mass, with placement of a HydroMARK butterfly clip  2   Uncomplicated ultrasound-guided core needle biopsy right axillary level 1 lymph node, with placement of a HydroMARK open coil clip  3  As discussed above, patient 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, metastatic inflammatory breast carcinoma  Given multiple abnormal masses and invasion of  pectoralis major, breast MRI would likely be recommended assuming results return malignant   RECOMMENDATION:      - Waiting for pathology for the right breast  Workstation ID: [id number]        Labs:   Lab Results   Component Value Date    WBC 5 67 12/14/2018    HGB 12 8 12/14/2018    HCT 39 8 12/14/2018    MCV 91 12/14/2018     (H) 12/14/2018     Lab Results   Component Value Date     12/27/2017    K 3 8 12/14/2018     12/14/2018    CO2 28 12/14/2018    ANIONGAP 6 09/08/2015    BUN 16 12/14/2018    CREATININE 0 76 12/14/2018    GLUCOSE 86 09/08/2015    CALCIUM 9 9 12/14/2018    AST 23 12/14/2018    ALT 30 12/14/2018    ALKPHOS 82 12/14/2018    PROT 7 2 12/27/2017    BILITOT 0 4 12/27/2017    EGFR 85 12/14/2018         Lab Results   Component Value Date    OBVWKTWM06 1,265 (H) 07/23/2018         Current Medications: Reviewed  Allergies: Reviewed  PMH/FH/SH:  Reviewed      Vital Sign:    Body surface area is 1 47 meters squared      Wt Readings from Last 3 Encounters:   03/14/19 54 4 kg (120 lb)   03/04/19 54 9 kg (121 lb)   02/18/19 54 kg (119 lb)        Temp Readings from Last 3 Encounters:   03/14/19 (!) 95 9 °F (35 5 °C) (Tympanic)   03/04/19 (!) 96 °F (35 6 °C) (Tympanic)   02/18/19 (!) 96 1 °F (35 6 °C) (Tympanic)        BP Readings from Last 3 Encounters:   03/14/19 148/72   03/04/19 110/70   02/18/19 140/90         Pulse Readings from Last 3 Encounters:   03/14/19 (!) 52   03/04/19 (!) 53   02/18/19 76     @LASTSAO2(3)@

## 2019-03-14 NOTE — PROGRESS NOTES
Hematology / Oncology Outpatient Follow Up Note    Geofm Rubinstein 82 y o  female :1935 [facility]:[id number]         Date:  3/14/2019    Assessment / Plan:  A 27-year-old postmenopausal woman with newly diagnosed at least locally advanced right breast cancer, grade 3, triple negative disease  She has quite large right breast mass, palpable axillary adenopathy as well as what appeared to be skin involvement  She has no evidence of distant metastasis based on the CT scan and bone scan  She presents today with her son to discuss treatment options  We had extensive discussion regarding the diagnosis, locally advanced nature of disease, aggressive triple negative phenotype, high risk disease, potentially curable condition as well as treatment options  With her stage as well as triple negative phenotype, mastectomy by self is highly unlikely to cure her condition  Despite her age, neoadjuvant chemotherapy should be considered  I recommended her to have Taxotere and cyclophosphamide with Neulasta support every 3 weeks times 4-6 cycle  Side effects of this regimen was thoroughly discussed, including but not limited to alopecia, nausea, vomiting, neutropenia, risk of infection, allergic reaction, neuropathy, peripheral edema as well as small chance of treatment related mortality is  She and her son understood and wished to proceed  After the neoadjuvant chemotherapy, she will need mastectomy and axillary lymph node dissection as well as adjuvant postmastectomy radiation therapy  She is going to start 1st cycle of neoadjuvant chemotherapy in 2019 followed by Neulasta support on day 2  Will see her again in 2019 with CBC and CMP to evaluate marrow toxicity    All the patient and her son's questions were answered to their satisfaction                                                                                                              Subjective:      HPI:  A 82year-old postmenopausal woman who noticed large right breast mass and developed breast pain which she brought to medical attention  She was found to have 3 5 x 2 x 3 0 cm of mass at 9:00 a m  position 5 cm from nipple  She also has palpable right axillary adenopathy  Biopsy from right breast in February 14, 2019 showed invasive ductal carcinoma, grade 3  This was triple negative disease  She was seen by Dr Demarco Man  She is going to have CT scan of chest abdomen pelvis as well as bone scan later this week  She presents today to discuss possible neoadjuvant chemotherapy  She has no other symptomatology  She denied respiratory symptoms such as cough, sputum production or shortness of breast   She has no recent weight loss  She denied any bone pain  She has past medical history of hypertension, arthritis as well as hypercholesteremia  She denied any family history of breast or ovarian cancer  She is a lifetime never smoker  She lived by herself  However, her son lived nearby            Interval History:   A 68-year-old postmenopausal woman with newly diagnosed locally advanced right breast cancer, grade 3, triple negative disease  She has large right breast mass, skin involvement as well as known right axillary adenopathy  She recently underwent CT scan of chest abdomen pelvis as well as bone scan both of which showed no evidence of distant metastasis  She presents today with her son to discuss neoadjuvant chemotherapy  She has no complaint of pain  Her weight is stable  She has no respiratory symptoms  She lived by herself  Her performance status is normal            Objective:      Primary Diagnosis:     At least locally advanced right breast cancer, grade 3, triple negative disease    Diagnosed in February 2019      Cancer Staging:  Cancer Staging  Malignant neoplasm of upper-outer quadrant of right female breast ([facility] [address] )  Staging form: Breast, AJCC 8th Edition  - Clinical: cT2, cN1(f), cM0, G3, ER: Unknown, IL: Unknown, HER2: Unknown - Signed by Sosa Gordon MD on 2/18/2019           Previous Hematologic/ Oncologic Treatment:            Current Hematologic/ Oncologic Treatment:       Neoadjuvant chemotherapy with Taxotere and cyclophosphamide times 4-6 cycle  First cycle to be started in March 21, 2019      Disease Status:      Not evaluated at this time      Test Results:     Pathology:     Right breast biopsy showed invasive ductal carcinoma, grade 3  ER negative, NH negative, HER2 negative disease      Radiology:     Mammography and ultrasound shows 3 5 x 2 0 x 3 0 cm of mass at 9:00 a m  position, 5 cm from nipple  Abnormal right axillary adenopathy is noted  CT scan of chest abdomen pelvis showed known large right breast mass and right axillary adenopathy  No evidence of distant metastasis  Bone scan showed no evidence of osseous metastasis      Laboratory:     CBC and CMP are within normal limits      Physical Exam:        General Appearance:    Alert, oriented          Eyes:    PERRL   Ears:    Normal external ear canals, both ears   Nose:   Nares normal, septum midline   Throat:   Mucosa moist  Pharynx without injection  Neck:   Supple         Lungs:     Clear to auscultation bilaterally   Chest Wall:    No tenderness or deformity    Heart:    Regular rate and rhythm         Abdomen:     Soft, non-tender, bowel sounds +, no organomegaly               Extremities:   Extremities no cyanosis or edema         Skin:   no rash or icterus  Lymph nodes:   Cervical, supraclavicular, and axillary nodes normal   Neurologic:   CNII-XII intact, normal strength, sensation and reflexes     Throughout             Breast exam:   Large right breast mass measuring 10 x 12 cm with edematous skin change  2 cm of palpable right axillary adenopathy  Left breast exam is negative                 ROS: Review of Systems   All other systems reviewed and are negative            Imaging: Ct Chest Abdomen Pelvis Wo Contrast    Result Date: 3/13/2019  Narrative: CT CHEST, ABDOMEN AND PELVIS WITHOUT IV CONTRAST INDICATION:   C50 411: Malignant neoplasm of upper-outer quadrant of right female breast C50 911: Malignant neoplasm of unspecified site of right female breast C77 3: Secondary and unspecified malignant neoplasm of axilla and upper limb lymph nodes  COMPARISON:  3/8/2019 TECHNIQUE: CT examination of the chest, abdomen and pelvis was performed without intravenous contrast   Axial, sagittal, and coronal 2D reformatted images were created from the source data and submitted for interpretation  Radiation dose length product (DLP) for this visit:  232 mGy-cm   This examination, like all CT scans performed in the [facility], was performed utilizing techniques to minimize radiation dose exposure, including the use of iterative reconstruction and automated exposure control  Enteric contrast was administered  No intravenous contrast was administered as per the ordering physician  The lack of intravenous contrast diminishes sensitivity for detecting metastatic disease  FINDINGS: CHEST LUNGS:  Mild scattered strandy densities noted in the right middle lobe and both lower lobes as well as slightly in the lingula noted likely subsegmental atelectasis and/or scarring  No rounded nodular mass to confirm pulmonary metastasis  PLEURA:  Unremarkable  HEART/GREAT VESSELS:  Mild atherosclerotic aortic calcifications noted  MEDIASTINUM AND MAIN:  Unremarkable  CHEST WALL AND LOWER NECK:   Markedly abnormal right breast with marked skin thickening  Additionally in the outer aspect of the right breast there is asymmetric increased spiculated radiodensity  The right pectoralis muscle is slightly thickened with fluid and edema in the right breast subcutaneous fat tracking inferiorly into the upper abdominal wall  Imaging features are suggestive of peau d'orange type changes  Bulky right axillary lymphadenopathy noted   ABDOMEN LIVER/BILIARY TREE: Unremarkable  GALLBLADDER:  No calcified gallstones  No pericholecystic inflammatory change  SPLEEN:  Unremarkable  PANCREAS:  Unremarkable  ADRENAL GLANDS:  Unremarkable  KIDNEYS/URETERS:  Unremarkable  No hydronephrosis  STOMACH AND BOWEL:  Unremarkable  APPENDIX:  No findings to suggest appendicitis  ABDOMINOPELVIC CAVITY:  No ascites or free intraperitoneal air  No lymphadenopathy  VESSELS:  Unremarkable for patient's age  PELVIS REPRODUCTIVE ORGANS:  Unremarkable for patient's age  URINARY BLADDER:  Unremarkable  ABDOMINAL WALL/INGUINAL REGIONS:  Unremarkable  OSSEOUS STRUCTURES:  Moderate levoscoliosis mid lumbar spine advanced degenerative changes of both hips  Scattered multilevel spondylosis  Impression: 1  Imaging features of right-sided inflammatory breast cancer with metastatic axillary lymphadenopathy, correlating to the clinical history  2   No definite evidence of metastatic disease in the abdomen and pelvis on this noncontrast CT scan  Workstation performed: [id number]     Nm Bone Scan Whole Body    Result Date: 3/12/2019  Narrative: BONE SCAN  WHOLE BODY INDICATION: New diagnosis of breast cancer  C50 411: Malignant neoplasm of upper-outer quadrant of right female breast C50 911: Malignant neoplasm of unspecified site of right female breast C77 3: Secondary and unspecified malignant neoplasm of axilla and upper limb lymph nodes PREVIOUS FILM CORRELATION:    CT chest, abdomen and pelvis 3/8/2019 TECHNIQUE:   This study was performed following the intravenous administration of 25 1 mCi Tc-99m labeled MDP  Delayed, anterior and posterior whole body images were acquired, 2-3 hours after radiopharmaceutical administration  Additional delayed static images acquired of the pelvis  FINDINGS:  Scattered foci of radiotracer uptake in the lumbar spine on the right corresponding degenerative changes on CT  Foci of radiotracer uptake at the L5 level bilaterally slightly more intense on the left    This corresponds to facet arthritic changes on CT  Scattered foci of radiotracer uptake at the bilateral shoulders and knees likely related to arthritic changes given the distribution  Renal activity is fairly symmetric  Mild soft tissue uptake in the right breast which may be related to recent procedure  There is no scintigraphic evidence of osseous metastasis  Impression: 1  No scintigraphic evidence of osseous metastasis  Workstation performed: [id number]     Us Guided Breast Biopsy Right Complete, Us Guided Breast Lymph Node Biopsy Right, Mammo Post Biopsy Right    Addendum Date: 2/18/2019 Addendum:   PATHOLOGY RESULTS: The results of the ultrasound-guided biopsy right breast 09:00 o'clock mass and right axillary lymph node have returned as invasive ductal carcinoma and metastatic carcinoma, respectively  The pathology is malignant and concordant with imaging  In review of the patients recent imaging, the right malignancy appears likely multicentric extending at least 5 5 cm transverse by 5 1 cm craniocaudal   Skin thickening suggest inflammatory carcinoma  Furthermore, additional masses involving pectoralis major were identified on recent ultrasound  Ultrasound of the right axilla on 02/06/2019 showed multiple metastatic lymph nodes, the most inferior of which was biopsied  Recent imaging of the contralateral left breast dated 02/06/2019 was reviewed and shows no suspicious findings  Recommendation is for surgical consultation  Additionally as previously discussed in the biopsy report, breast MRI should be considered to evaluate extent of disease and to delineate muscle involvement  RECOMMENDATION:      - Surgical Consultation for the right breast      Result Date: 2/14/2019  Narrative: DIAGNOSIS:  INDICATION: Zackery Mcdaniel is a 82 y o  female presenting ultrasound-guided biopsy right breast mass and abnormal right axillary lymph node  Prior to the procedure, previous imaging was reviewed      I discussed with the patient to the very high likelihood of positive results  Patient has at least multifocal common more likely multicentric right breast carcinoma with skin thickening suggestive of inflammatory carcinoma  On diagnostic ultrasound, there were masses invading pectoralis major as well as multiple enlarged axillary lymph nodes  Although initially multi site right breast biopsy was recommended, I discussed with the patient that only biopsy of the dominant mass and a likely metastatic axillary lymph node would be performed at this time  A 2nd site right breast biopsy would not change immediate clinical management  Furthermore, as suspicious masses are likely multicentric and given  pectoral invasion, breast MRI would already be indicated  The procedure was explained to the patient in detail  All questions were answered  Written and verbal informed consent was obtained  FINDINGS: RIGHT 1) MASS US guided breast biopsy right complete: Images of the right breast mass at 9 o'clock in the middle portion, 5 cm from the nipple were obtained  A core needle biopsy of a hypoechoic mass was performed under ultrasound guidance using a lateral approach  The skin was prepped in the usual fashion  Anesthesia was administered using lidocaine 1%  A 12 G  Marquee needle was used to obtain 2 core samples  A  HydroMARK butterfly clip was inserted into the target area  Post-placement digital mammographic imaging was performed  The patient tolerated the procedure well  There were no immediate complications  5) LYMPH NODE US guided breast biopsy right complete: Images of the right  Axilla were obtained  Numerous  Morphologically abnormal lymph nodes were again identified  The inferior-most lymph node was targeted for biopsy  A core needle biopsy was performed under ultrasound guidance using an inferolateral approach  The skin was prepped in the usual fashion  Anesthesia was administered using lidocaine 1%   A 16 G Marquee needle was used to obtain 2 core samples  A  HydroMARK open coil clip was inserted into the lymph node cortex  Clip placement within lymph node cortex was documented in two orthogonal sonographic projections  This was not able to be seen on post biopsy mammogram      Impression:  1  Uncomplicated ultrasound-guided core needle biopsy right breast 09:00 o'clock mass, with placement of a HydroMARK butterfly clip  2   Uncomplicated ultrasound-guided core needle biopsy right axillary level 1 lymph node, with placement of a HydroMARK open coil clip  3  As discussed above, patient 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, metastatic inflammatory breast carcinoma  Given multiple abnormal masses and invasion of  pectoralis major, breast MRI would likely be recommended assuming results return malignant  RECOMMENDATION:      - Waiting for pathology for the right breast  Workstation ID: [id number]        Labs:   Lab Results   Component Value Date    WBC 5 67 12/14/2018    HGB 12 8 12/14/2018    HCT 39 8 12/14/2018    MCV 91 12/14/2018     (H) 12/14/2018     Lab Results   Component Value Date     12/27/2017    K 3 8 12/14/2018     12/14/2018    CO2 28 12/14/2018    ANIONGAP 6 09/08/2015    BUN 16 12/14/2018    CREATININE 0 76 12/14/2018    GLUCOSE 86 09/08/2015    CALCIUM 9 9 12/14/2018    AST 23 12/14/2018    ALT 30 12/14/2018    ALKPHOS 82 12/14/2018    PROT 7 2 12/27/2017    BILITOT 0 4 12/27/2017    EGFR 85 12/14/2018         Lab Results   Component Value Date    PXRXIVEP09 1,265 (H) 07/23/2018         Current Medications: Reviewed  Allergies: Reviewed  PMH/FH/SH:  Reviewed      Vital Sign:    Body surface area is 1 47 meters squared      Wt Readings from Last 3 Encounters:   03/14/19 54 4 kg (120 lb)   03/04/19 54 9 kg (121 lb)   02/18/19 54 kg (119 lb)        Temp Readings from Last 3 Encounters:   03/14/19 (!) 95 9 °F (35 5 °C) (Tympanic)   03/04/19 (!) 96 °F (35 6 °C) (Tympanic)   02/18/19 (!) 96 1 °F (35 6 °C) (Tympanic)        BP Readings from Last 3 Encounters:   03/14/19 148/72   03/04/19 110/70   02/18/19 140/90         Pulse Readings from Last 3 Encounters:   03/14/19 (!) 52   03/04/19 (!) 53   02/18/19 76     @LASTSAO2(3)@

## 2019-03-14 NOTE — PROGRESS NOTES
Breast Cancer Nurse Navigator    Met with and accompanied patient and son during medical oncology visit with Dr Angel Carcamo  Patient and son had several questions that were answered by Dr Angel Carcamo throughout visit  Patient will likely stay with son during chemotherapy treatment  Is inquiring about home care services for nurse and home health aide to assist if needed  Advised that we can address if/when need arises but I explained how home care would work as well as required homebound status  Instructed in more detail on possible side effects and management and who to call to report side effects or concerns  Mailed out printed information on chemotherapy medications as well as Neulasta stressing importance of understanding that side effects are possible and importance of calling with temp 100 4 or more or with any unmanaged or intolerable side effects  Son appears to require support as well, was emotional during visit during conversation pertaining to treatment and patients response to treatment  Provided active listening and emotional support  Patient and son have my contact information and will call me as needed  I will follow patient closely for education, assessment and support as needed

## 2019-03-16 ENCOUNTER — APPOINTMENT (OUTPATIENT)
Dept: LAB | Facility: MEDICAL CENTER | Age: 84
End: 2019-03-16
Payer: COMMERCIAL

## 2019-03-16 DIAGNOSIS — Z17.1 MALIGNANT NEOPLASM OF UPPER-OUTER QUADRANT OF RIGHT BREAST IN FEMALE, ESTROGEN RECEPTOR NEGATIVE (HCC): ICD-10-CM

## 2019-03-16 DIAGNOSIS — C50.911 CARCINOMA OF RIGHT BREAST METASTATIC TO AXILLARY LYMPH NODE (HCC): ICD-10-CM

## 2019-03-16 DIAGNOSIS — C77.3 CARCINOMA OF RIGHT BREAST METASTATIC TO AXILLARY LYMPH NODE (HCC): ICD-10-CM

## 2019-03-16 DIAGNOSIS — C50.411 MALIGNANT NEOPLASM OF UPPER-OUTER QUADRANT OF RIGHT BREAST IN FEMALE, ESTROGEN RECEPTOR NEGATIVE (HCC): ICD-10-CM

## 2019-03-16 LAB
ALBUMIN SERPL BCP-MCNC: 3.9 G/DL (ref 3.5–5)
ALP SERPL-CCNC: 73 U/L (ref 46–116)
ALT SERPL W P-5'-P-CCNC: 28 U/L (ref 12–78)
ANION GAP SERPL CALCULATED.3IONS-SCNC: 7 MMOL/L (ref 4–13)
AST SERPL W P-5'-P-CCNC: 26 U/L (ref 5–45)
BASOPHILS # BLD AUTO: 0.03 THOUSANDS/ΜL (ref 0–0.1)
BASOPHILS NFR BLD AUTO: 1 % (ref 0–1)
BILIRUB SERPL-MCNC: 0.35 MG/DL (ref 0.2–1)
BUN SERPL-MCNC: 15 MG/DL (ref 5–25)
CALCIUM SERPL-MCNC: 9.6 MG/DL (ref 8.3–10.1)
CHLORIDE SERPL-SCNC: 105 MMOL/L (ref 100–108)
CO2 SERPL-SCNC: 27 MMOL/L (ref 21–32)
CREAT SERPL-MCNC: 0.79 MG/DL (ref 0.6–1.3)
EOSINOPHIL # BLD AUTO: 0.08 THOUSAND/ΜL (ref 0–0.61)
EOSINOPHIL NFR BLD AUTO: 2 % (ref 0–6)
ERYTHROCYTE [DISTWIDTH] IN BLOOD BY AUTOMATED COUNT: 14.6 % (ref 11.6–15.1)
GFR SERPL CREATININE-BSD FRML MDRD: 81 ML/MIN/1.73SQ M
GLUCOSE P FAST SERPL-MCNC: 77 MG/DL (ref 65–99)
HCT VFR BLD AUTO: 37.9 % (ref 34.8–46.1)
HGB BLD-MCNC: 12.5 G/DL (ref 11.5–15.4)
IMM GRANULOCYTES # BLD AUTO: 0.01 THOUSAND/UL (ref 0–0.2)
IMM GRANULOCYTES NFR BLD AUTO: 0 % (ref 0–2)
LYMPHOCYTES # BLD AUTO: 1.45 THOUSANDS/ΜL (ref 0.6–4.47)
LYMPHOCYTES NFR BLD AUTO: 33 % (ref 14–44)
MCH RBC QN AUTO: 29.6 PG (ref 26.8–34.3)
MCHC RBC AUTO-ENTMCNC: 33 G/DL (ref 31.4–37.4)
MCV RBC AUTO: 90 FL (ref 82–98)
MONOCYTES # BLD AUTO: 0.4 THOUSAND/ΜL (ref 0.17–1.22)
MONOCYTES NFR BLD AUTO: 9 % (ref 4–12)
NEUTROPHILS # BLD AUTO: 2.49 THOUSANDS/ΜL (ref 1.85–7.62)
NEUTS SEG NFR BLD AUTO: 55 % (ref 43–75)
NRBC BLD AUTO-RTO: 0 /100 WBCS
PLATELET # BLD AUTO: 333 THOUSANDS/UL (ref 149–390)
PMV BLD AUTO: 10.4 FL (ref 8.9–12.7)
POTASSIUM SERPL-SCNC: 3.3 MMOL/L (ref 3.5–5.3)
PROT SERPL-MCNC: 7.7 G/DL (ref 6.4–8.2)
RBC # BLD AUTO: 4.23 MILLION/UL (ref 3.81–5.12)
SODIUM SERPL-SCNC: 139 MMOL/L (ref 136–145)
WBC # BLD AUTO: 4.46 THOUSAND/UL (ref 4.31–10.16)

## 2019-03-16 PROCEDURE — 80053 COMPREHEN METABOLIC PANEL: CPT

## 2019-03-16 PROCEDURE — 85025 COMPLETE CBC W/AUTO DIFF WBC: CPT

## 2019-03-16 PROCEDURE — 36415 COLL VENOUS BLD VENIPUNCTURE: CPT

## 2019-03-20 ENCOUNTER — TELEPHONE (OUTPATIENT)
Dept: SURGICAL ONCOLOGY | Facility: CLINIC | Age: 84
End: 2019-03-20

## 2019-03-20 NOTE — TELEPHONE ENCOUNTER
----- Message from Victorino Dial sent at 3/20/2019  1:12 PM EDT -----  Regarding: FW: Lab question  Alex Muse,     I spoke to MILADY Mobley covering for Dr Jose Driscoll today  She informed me that the patient needs to get her labs done again because those labs cannot be used for her first treatment  Please contact the patient and provider her with this information  Please let me know if there is anything else that I can help you with  Thank you,  Olga Lidia Rowe     ----- Message -----  From: Chino Long RN  Sent: 3/20/2019  10:12 AM  To: Olga Lidia Rowe MA  Subject: Lab question                                     Eamon Milton,     Just wanted to double check that Piedad Harding is ok with having done her labs this past Saturday with her first treatment being 03/26  I thinks that's ok this time, please confirm  I did tell her to get them done going forward Saturday prior to Tuesday treatment         Thanks,    Alex Muse  [phone]

## 2019-03-20 NOTE — TELEPHONE ENCOUNTER
Breast Cancer Nurse Navigator    Returned call to patient who had multiple questions regarding treatment and clarification on labs, medications, side effects, wig etc   Patient confirmed she did receive treatment and resource information packet I mailed out to her  Discussed her diagnosis and why treatment plan recommended in simple terms, patient verbalized clear understanding  She is also aware to ask questions and report all concerning symptoms as needed  Reviewed timing of labs draw that should be completed within at least 3 days prior to each treatment  Patient did  script for Reglan to take as needed for nausea vomiting, is aware to call to report if not effective  Instructed patient on where infusion center is, provided with address and suite number, same building opposite entrance as where she comes to see Dr Jose Angel [place] and Dr Joyce Robison  Discussed hair loss and wig, patient has some wigs at home at this time but instructed if would like to purchase a wig, insurance covers with a script from Dr Joyce Robison, patient aware and will ask if needed  Patient with no additional questions, answered all her current questions to her satisfaction  Patient encourage to call as needed with any questions, concerns or needs as often as needed  I will continue to follow patient closely as needed

## 2019-03-20 NOTE — TELEPHONE ENCOUNTER
Breast Cancer Nurse Navigator    As per Itz, patient to have labs redrawn as they were drawn too early prior to first chemotherapy treatment  Called patient and instructed on same  Patient stated she will go on Saturday 03/23/19 to the [facility]  Patient states she does not have a script  Advised that I will request a new script for lab redraw  Patient asked if script can be faxed to lab, advised that I will make request to medical oncology office  Patient again instructed to have labs drawn no more than 72 hours prior to each chemotherapy treatment  Patient verbalized understanding

## 2019-03-21 ENCOUNTER — TRANSCRIBE ORDERS (OUTPATIENT)
Dept: LAB | Facility: MEDICAL CENTER | Age: 84
End: 2019-03-21

## 2019-03-21 DIAGNOSIS — C77.3 CARCINOMA OF RIGHT BREAST METASTATIC TO AXILLARY LYMPH NODE (HCC): Primary | ICD-10-CM

## 2019-03-21 DIAGNOSIS — C50.911 CARCINOMA OF RIGHT BREAST METASTATIC TO AXILLARY LYMPH NODE (HCC): Primary | ICD-10-CM

## 2019-03-21 NOTE — TELEPHONE ENCOUNTER
Kiana Benítez and was provided the fax number of [phone]  Faxed over lab req  Form and instructed office to call our office if they have questions

## 2019-03-21 NOTE — TELEPHONE ENCOUNTER
Called [facility] to confirm faxed script received to assure patient is able to successfully have labs drawn on Saturday  Spoke to lab representative who states she does not see the fax but will look for it  I provided her with patients name and  and that she will be there 19 for lab draw  Confirmed as labs visible in epic in the event faxed script not found they can look up script in Epic and draw ordered labs  Per rep they can use labs ordered via epic  Call placed to patient and informed as above and that she is all set for lab Saturday  Patient thankful for the follow up and will have labs redrawn as instructed  Patient with no additional needs at this time  I will continue to follow up as needed

## 2019-03-23 ENCOUNTER — APPOINTMENT (OUTPATIENT)
Dept: LAB | Facility: MEDICAL CENTER | Age: 84
End: 2019-03-23
Payer: COMMERCIAL

## 2019-03-23 LAB
ALBUMIN SERPL BCP-MCNC: 4.4 G/DL (ref 3.5–5)
ALP SERPL-CCNC: 76 U/L (ref 46–116)
ALT SERPL W P-5'-P-CCNC: 29 U/L (ref 12–78)
ANION GAP SERPL CALCULATED.3IONS-SCNC: 5 MMOL/L (ref 4–13)
AST SERPL W P-5'-P-CCNC: 28 U/L (ref 5–45)
BASOPHILS # BLD AUTO: 0.03 THOUSANDS/ΜL (ref 0–0.1)
BASOPHILS NFR BLD AUTO: 0 % (ref 0–1)
BILIRUB SERPL-MCNC: 0.37 MG/DL (ref 0.2–1)
BUN SERPL-MCNC: 14 MG/DL (ref 5–25)
CALCIUM SERPL-MCNC: 9.9 MG/DL (ref 8.3–10.1)
CHLORIDE SERPL-SCNC: 105 MMOL/L (ref 100–108)
CO2 SERPL-SCNC: 27 MMOL/L (ref 21–32)
CREAT SERPL-MCNC: 0.81 MG/DL (ref 0.6–1.3)
EOSINOPHIL # BLD AUTO: 0.1 THOUSAND/ΜL (ref 0–0.61)
EOSINOPHIL NFR BLD AUTO: 2 % (ref 0–6)
ERYTHROCYTE [DISTWIDTH] IN BLOOD BY AUTOMATED COUNT: 14.8 % (ref 11.6–15.1)
GFR SERPL CREATININE-BSD FRML MDRD: 78 ML/MIN/1.73SQ M
GLUCOSE P FAST SERPL-MCNC: 89 MG/DL (ref 65–99)
HCT VFR BLD AUTO: 40 % (ref 34.8–46.1)
HGB BLD-MCNC: 12.9 G/DL (ref 11.5–15.4)
IMM GRANULOCYTES # BLD AUTO: 0.02 THOUSAND/UL (ref 0–0.2)
IMM GRANULOCYTES NFR BLD AUTO: 0 % (ref 0–2)
LYMPHOCYTES # BLD AUTO: 1.73 THOUSANDS/ΜL (ref 0.6–4.47)
LYMPHOCYTES NFR BLD AUTO: 26 % (ref 14–44)
MCH RBC QN AUTO: 29.1 PG (ref 26.8–34.3)
MCHC RBC AUTO-ENTMCNC: 32.3 G/DL (ref 31.4–37.4)
MCV RBC AUTO: 90 FL (ref 82–98)
MONOCYTES # BLD AUTO: 0.53 THOUSAND/ΜL (ref 0.17–1.22)
MONOCYTES NFR BLD AUTO: 8 % (ref 4–12)
NEUTROPHILS # BLD AUTO: 4.33 THOUSANDS/ΜL (ref 1.85–7.62)
NEUTS SEG NFR BLD AUTO: 64 % (ref 43–75)
NRBC BLD AUTO-RTO: 0 /100 WBCS
PLATELET # BLD AUTO: 333 THOUSANDS/UL (ref 149–390)
PMV BLD AUTO: 10.6 FL (ref 8.9–12.7)
POTASSIUM SERPL-SCNC: 3.2 MMOL/L (ref 3.5–5.3)
PROT SERPL-MCNC: 8.3 G/DL (ref 6.4–8.2)
RBC # BLD AUTO: 4.44 MILLION/UL (ref 3.81–5.12)
SODIUM SERPL-SCNC: 137 MMOL/L (ref 136–145)
WBC # BLD AUTO: 6.74 THOUSAND/UL (ref 4.31–10.16)

## 2019-03-23 PROCEDURE — 80053 COMPREHEN METABOLIC PANEL: CPT

## 2019-03-23 PROCEDURE — 36415 COLL VENOUS BLD VENIPUNCTURE: CPT

## 2019-03-23 PROCEDURE — 85025 COMPLETE CBC W/AUTO DIFF WBC: CPT

## 2019-03-25 RX ORDER — SODIUM CHLORIDE 9 MG/ML
20 INJECTION, SOLUTION INTRAVENOUS CONTINUOUS
Status: DISCONTINUED | OUTPATIENT
Start: 2019-03-26 | End: 2019-03-29 | Stop reason: HOSPADM

## 2019-03-26 ENCOUNTER — HOSPITAL ENCOUNTER (OUTPATIENT)
Dept: INFUSION CENTER | Facility: CLINIC | Age: 84
Discharge: HOME | End: 2019-03-26
Payer: COMMERCIAL

## 2019-03-26 ENCOUNTER — TELEPHONE (OUTPATIENT)
Dept: HEMATOLOGY ONCOLOGY | Facility: CLINIC | Age: 84
End: 2019-03-26

## 2019-03-26 VITALS
SYSTOLIC BLOOD PRESSURE: 172 MMHG | TEMPERATURE: 96.6 F | RESPIRATION RATE: 18 BRPM | BODY MASS INDEX: 24.85 KG/M2 | HEART RATE: 60 BPM | DIASTOLIC BLOOD PRESSURE: 75 MMHG | WEIGHT: 118.39 LBS | HEIGHT: 58 IN

## 2019-03-26 DIAGNOSIS — C50.911 CARCINOMA OF RIGHT BREAST METASTATIC TO AXILLARY LYMPH NODE (HCC): Primary | ICD-10-CM

## 2019-03-26 DIAGNOSIS — C77.3 CARCINOMA OF RIGHT BREAST METASTATIC TO AXILLARY LYMPH NODE (HCC): Primary | ICD-10-CM

## 2019-03-26 PROCEDURE — 96367 TX/PROPH/DG ADDL SEQ IV INF: CPT

## 2019-03-26 PROCEDURE — 96413 CHEMO IV INFUSION 1 HR: CPT

## 2019-03-26 PROCEDURE — 96417 CHEMO IV INFUS EACH ADDL SEQ: CPT

## 2019-03-26 RX ADMIN — DEXAMETHASONE SODIUM PHOSPHATE: 10 INJECTION, SOLUTION INTRAMUSCULAR; INTRAVENOUS at 10:20

## 2019-03-26 RX ADMIN — CYCLOPHOSPHAMIDE 882 MG: 1 INJECTION, POWDER, FOR SOLUTION INTRAVENOUS; ORAL at 12:02

## 2019-03-26 RX ADMIN — DOCETAXEL 110 MG: 20 INJECTION, SOLUTION, CONCENTRATE INTRAVENOUS at 10:57

## 2019-03-26 RX ADMIN — SODIUM CHLORIDE 20 ML/HR: 0.9 INJECTION, SOLUTION INTRAVENOUS at 10:20

## 2019-03-26 NOTE — PROGRESS NOTES
Patient tolerated infusion well  Denies any discomfort  Pt is aware to return tomorrow for neulasta

## 2019-03-26 NOTE — PLAN OF CARE
Problem: Potential for Falls  Goal: Patient will remain free of falls  Description  INTERVENTIONS:  - Assess patient frequently for physical needs  -  Identify cognitive and physical deficits and behaviors that affect risk of falls    -  [place] fall precautions as indicated by assessment   - Educate patient/family on patient safety including physical limitations  - Instruct patient to call for assistance with activity based on assessment  - Modify environment to reduce risk of injury  - Consider OT/PT consult to assist with strengthening/mobility  Outcome: Progressing

## 2019-03-26 NOTE — TELEPHONE ENCOUNTER
Pearl Purcell from  Infusion called on 3/26/19 regarding patient stating that today is her 1st chemo treatment then patient will continue going every three weeks  There were no labs put in for this patient- infusion wants us to put in labs in-whatever it may be that patient needs  Pearl Purcell stated it's usually a CBC and a CMP - going to put those orders in- but, if there is anything other labs that the patient needs they will need to be put in  This is a Dr Diaz patient

## 2019-03-27 ENCOUNTER — HOSPITAL ENCOUNTER (OUTPATIENT)
Dept: INFUSION CENTER | Facility: CLINIC | Age: 84
Discharge: HOME | End: 2019-03-27
Payer: COMMERCIAL

## 2019-03-27 VITALS — TEMPERATURE: 98 F

## 2019-03-27 PROCEDURE — 96372 THER/PROPH/DIAG INJ SC/IM: CPT

## 2019-03-27 RX ADMIN — PEGFILGRASTIM 6 MG: 6 INJECTION SUBCUTANEOUS at 12:52

## 2019-03-27 NOTE — PLAN OF CARE
Problem: SAFETY ADULT  Goal: Patient will remain free of falls  Description  INTERVENTIONS:  - Assess patient frequently for physical needs  -  Identify cognitive and physical deficits and behaviors that affect risk of falls    -  [place] fall precautions as indicated by assessment   - Educate patient/family on patient safety including physical limitations  - Instruct patient to call for assistance with activity based on assessment  - Modify environment to reduce risk of injury  - Consider OT/PT consult to assist with strengthening/mobility  Outcome: Progressing

## 2019-03-27 NOTE — PROGRESS NOTES
Patient tolerated her neulasta injection well without adverse reaction  Reviewed the use of reglan and ativan

## 2019-04-01 ENCOUNTER — TELEPHONE (OUTPATIENT)
Dept: SURGICAL ONCOLOGY | Facility: CLINIC | Age: 84
End: 2019-04-01

## 2019-04-02 DIAGNOSIS — I10 ESSENTIAL HYPERTENSION: ICD-10-CM

## 2019-04-02 RX ORDER — AMLODIPINE BESYLATE AND BENAZEPRIL HYDROCHLORIDE 10; 20 MG/1; MG/1
CAPSULE ORAL
Qty: 90 CAPSULE | Refills: 0 | Status: SHIPPED | OUTPATIENT
Start: 2019-04-02 | End: 2019-07-01 | Stop reason: SDUPTHER

## 2019-04-11 ENCOUNTER — APPOINTMENT (OUTPATIENT)
Dept: LAB | Facility: MEDICAL CENTER | Age: 84
End: 2019-04-11
Payer: COMMERCIAL

## 2019-04-11 DIAGNOSIS — C77.3 CARCINOMA OF RIGHT BREAST METASTATIC TO AXILLARY LYMPH NODE (HCC): ICD-10-CM

## 2019-04-11 DIAGNOSIS — C50.911 CARCINOMA OF RIGHT BREAST METASTATIC TO AXILLARY LYMPH NODE (HCC): ICD-10-CM

## 2019-04-11 LAB
ALBUMIN SERPL BCP-MCNC: 3.8 G/DL (ref 3.5–5)
ALP SERPL-CCNC: 155 U/L (ref 46–116)
ALT SERPL W P-5'-P-CCNC: 73 U/L (ref 12–78)
ANION GAP SERPL CALCULATED.3IONS-SCNC: 3 MMOL/L (ref 4–13)
ANISOCYTOSIS BLD QL SMEAR: PRESENT
AST SERPL W P-5'-P-CCNC: 45 U/L (ref 5–45)
BASOPHILS # BLD MANUAL: 0 THOUSAND/UL (ref 0–0.1)
BASOPHILS NFR MAR MANUAL: 0 % (ref 0–1)
BILIRUB SERPL-MCNC: 0.24 MG/DL (ref 0.2–1)
BUN SERPL-MCNC: 10 MG/DL (ref 5–25)
CALCIUM SERPL-MCNC: 9.3 MG/DL (ref 8.3–10.1)
CHLORIDE SERPL-SCNC: 108 MMOL/L (ref 100–108)
CO2 SERPL-SCNC: 28 MMOL/L (ref 21–32)
CREAT SERPL-MCNC: 0.79 MG/DL (ref 0.6–1.3)
EOSINOPHIL # BLD MANUAL: 0 THOUSAND/UL (ref 0–0.4)
EOSINOPHIL NFR BLD MANUAL: 0 % (ref 0–6)
ERYTHROCYTE [DISTWIDTH] IN BLOOD BY AUTOMATED COUNT: 16 % (ref 11.6–15.1)
GFR SERPL CREATININE-BSD FRML MDRD: 81 ML/MIN/1.73SQ M
GLUCOSE P FAST SERPL-MCNC: 82 MG/DL (ref 65–99)
HCT VFR BLD AUTO: 35.4 % (ref 34.8–46.1)
HGB BLD-MCNC: 11.5 G/DL (ref 11.5–15.4)
LYMPHOCYTES # BLD AUTO: 1.08 THOUSAND/UL (ref 0.6–4.47)
LYMPHOCYTES # BLD AUTO: 7 % (ref 14–44)
MCH RBC QN AUTO: 29 PG (ref 26.8–34.3)
MCHC RBC AUTO-ENTMCNC: 32.5 G/DL (ref 31.4–37.4)
MCV RBC AUTO: 89 FL (ref 82–98)
MONOCYTES # BLD AUTO: 1.23 THOUSAND/UL (ref 0–1.22)
MONOCYTES NFR BLD: 8 % (ref 4–12)
NEUTROPHILS # BLD MANUAL: 12.79 THOUSAND/UL (ref 1.85–7.62)
NEUTS BAND NFR BLD MANUAL: 4 % (ref 0–8)
NEUTS SEG NFR BLD AUTO: 79 % (ref 43–75)
NRBC BLD AUTO-RTO: 1 /100 WBCS
NRBC BLD AUTO-RTO: 2 /100 WBC (ref 0–2)
PLATELET # BLD AUTO: 299 THOUSANDS/UL (ref 149–390)
PLATELET BLD QL SMEAR: ADEQUATE
PMV BLD AUTO: 10.8 FL (ref 8.9–12.7)
POLYCHROMASIA BLD QL SMEAR: PRESENT
POTASSIUM SERPL-SCNC: 3.3 MMOL/L (ref 3.5–5.3)
PROT SERPL-MCNC: 7.6 G/DL (ref 6.4–8.2)
RBC # BLD AUTO: 3.97 MILLION/UL (ref 3.81–5.12)
RBC MORPH BLD: PRESENT
SODIUM SERPL-SCNC: 139 MMOL/L (ref 136–145)
VARIANT LYMPHS # BLD AUTO: 2 %
WBC # BLD AUTO: 15.41 THOUSAND/UL (ref 4.31–10.16)

## 2019-04-11 PROCEDURE — 85007 BL SMEAR W/DIFF WBC COUNT: CPT

## 2019-04-11 PROCEDURE — 80053 COMPREHEN METABOLIC PANEL: CPT

## 2019-04-11 PROCEDURE — 36415 COLL VENOUS BLD VENIPUNCTURE: CPT

## 2019-04-11 PROCEDURE — 85027 COMPLETE CBC AUTOMATED: CPT

## 2019-04-12 ENCOUNTER — OFFICE VISIT (OUTPATIENT)
Dept: HEMATOLOGY ONCOLOGY | Facility: CLINIC | Age: 84
End: 2019-04-12
Payer: COMMERCIAL

## 2019-04-12 VITALS
OXYGEN SATURATION: 98 % | RESPIRATION RATE: 18 BRPM | BODY MASS INDEX: 25.15 KG/M2 | DIASTOLIC BLOOD PRESSURE: 80 MMHG | WEIGHT: 119.8 LBS | TEMPERATURE: 97.7 F | HEIGHT: 58 IN | SYSTOLIC BLOOD PRESSURE: 128 MMHG | HEART RATE: 66 BPM

## 2019-04-12 DIAGNOSIS — Z17.1 MALIGNANT NEOPLASM OF UPPER-OUTER QUADRANT OF RIGHT BREAST IN FEMALE, ESTROGEN RECEPTOR NEGATIVE (HCC): ICD-10-CM

## 2019-04-12 DIAGNOSIS — C50.411 MALIGNANT NEOPLASM OF UPPER-OUTER QUADRANT OF RIGHT BREAST IN FEMALE, ESTROGEN RECEPTOR NEGATIVE (HCC): ICD-10-CM

## 2019-04-12 DIAGNOSIS — C50.911 CARCINOMA OF RIGHT BREAST METASTATIC TO AXILLARY LYMPH NODE (HCC): Primary | ICD-10-CM

## 2019-04-12 DIAGNOSIS — C77.3 CARCINOMA OF RIGHT BREAST METASTATIC TO AXILLARY LYMPH NODE (HCC): Primary | ICD-10-CM

## 2019-04-12 PROCEDURE — 99214 OFFICE O/P EST MOD 30 MIN: CPT | Performed by: INTERNAL MEDICINE

## 2019-04-15 DIAGNOSIS — F41.1 GENERALIZED ANXIETY DISORDER: ICD-10-CM

## 2019-04-15 RX ORDER — LORAZEPAM 0.5 MG/1
TABLET ORAL
Qty: 60 TABLET | Refills: 0 | Status: SHIPPED | OUTPATIENT
Start: 2019-04-15 | End: 2019-05-06 | Stop reason: SDUPTHER

## 2019-04-15 RX ORDER — SODIUM CHLORIDE 9 MG/ML
20 INJECTION, SOLUTION INTRAVENOUS CONTINUOUS
Status: DISCONTINUED | OUTPATIENT
Start: 2019-04-16 | End: 2019-04-19 | Stop reason: HOSPADM

## 2019-04-16 ENCOUNTER — DOCUMENTATION (OUTPATIENT)
Dept: SURGICAL ONCOLOGY | Facility: CLINIC | Age: 84
End: 2019-04-16

## 2019-04-16 ENCOUNTER — HOSPITAL ENCOUNTER (OUTPATIENT)
Dept: INFUSION CENTER | Facility: CLINIC | Age: 84
Discharge: HOME | End: 2019-04-16
Payer: COMMERCIAL

## 2019-04-16 VITALS
HEIGHT: 58 IN | SYSTOLIC BLOOD PRESSURE: 178 MMHG | DIASTOLIC BLOOD PRESSURE: 79 MMHG | HEART RATE: 57 BPM | BODY MASS INDEX: 25.27 KG/M2 | RESPIRATION RATE: 16 BRPM | TEMPERATURE: 98.8 F | WEIGHT: 120.37 LBS

## 2019-04-16 PROCEDURE — 96367 TX/PROPH/DG ADDL SEQ IV INF: CPT

## 2019-04-16 PROCEDURE — 96417 CHEMO IV INFUS EACH ADDL SEQ: CPT

## 2019-04-16 PROCEDURE — 96413 CHEMO IV INFUSION 1 HR: CPT

## 2019-04-16 RX ADMIN — CYCLOPHOSPHAMIDE 882 MG: 1 INJECTION, POWDER, FOR SOLUTION INTRAVENOUS; ORAL at 12:56

## 2019-04-16 RX ADMIN — DEXAMETHASONE SODIUM PHOSPHATE: 10 INJECTION, SOLUTION INTRAMUSCULAR; INTRAVENOUS at 11:10

## 2019-04-16 RX ADMIN — DOCETAXEL 110 MG: 20 INJECTION, SOLUTION, CONCENTRATE INTRAVENOUS at 11:46

## 2019-04-16 RX ADMIN — SODIUM CHLORIDE 20 ML/HR: 0.9 INJECTION, SOLUTION INTRAVENOUS at 11:05

## 2019-04-16 NOTE — PLAN OF CARE
Problem: Potential for Falls  Goal: Patient will remain free of falls  Description  INTERVENTIONS:  - Assess patient frequently for physical needs  -  Identify cognitive and physical deficits and behaviors that affect risk of falls    -  [place] fall precautions as indicated by assessment   - Educate patient/family on patient safety including physical limitations  - Instruct patient to call for assistance with activity based on assessment  - Modify environment to reduce risk of injury  - Consider OT/PT consult to assist with strengthening/mobility  Outcome: Progressing

## 2019-04-16 NOTE — PROGRESS NOTES
Pt here for chemo today  Offers no new complaints  Tolerated well without complications  Patient aware to return tomorrow at 1400 for neulasta  AVS provided

## 2019-04-17 ENCOUNTER — HOSPITAL ENCOUNTER (OUTPATIENT)
Dept: INFUSION CENTER | Facility: CLINIC | Age: 84
Discharge: HOME | End: 2019-04-17
Payer: COMMERCIAL

## 2019-04-17 VITALS
SYSTOLIC BLOOD PRESSURE: 165 MMHG | HEART RATE: 52 BPM | TEMPERATURE: 97.6 F | DIASTOLIC BLOOD PRESSURE: 77 MMHG | RESPIRATION RATE: 18 BRPM

## 2019-04-17 PROCEDURE — 96372 THER/PROPH/DIAG INJ SC/IM: CPT

## 2019-04-17 RX ADMIN — PEGFILGRASTIM 6 MG: 6 INJECTION SUBCUTANEOUS at 14:09

## 2019-04-19 ENCOUNTER — TELEPHONE (OUTPATIENT)
Dept: SURGICAL ONCOLOGY | Facility: CLINIC | Age: 84
End: 2019-04-19

## 2019-04-22 DIAGNOSIS — I10 ESSENTIAL HYPERTENSION: ICD-10-CM

## 2019-04-22 RX ORDER — HYDRALAZINE HYDROCHLORIDE 25 MG/1
TABLET, FILM COATED ORAL
Qty: 90 TABLET | Refills: 0 | Status: SHIPPED | OUTPATIENT
Start: 2019-04-22 | End: 2019-06-07 | Stop reason: SDUPTHER

## 2019-04-23 ENCOUNTER — HOSPITAL ENCOUNTER (OUTPATIENT)
Dept: INFUSION CENTER | Facility: CLINIC | Age: 84
Discharge: HOME | End: 2019-04-23
Payer: COMMERCIAL

## 2019-04-23 ENCOUNTER — TELEPHONE (OUTPATIENT)
Dept: SURGICAL ONCOLOGY | Facility: CLINIC | Age: 84
End: 2019-04-23

## 2019-04-23 ENCOUNTER — TELEPHONE (OUTPATIENT)
Dept: HEMATOLOGY ONCOLOGY | Facility: CLINIC | Age: 84
End: 2019-04-23

## 2019-04-23 VITALS
SYSTOLIC BLOOD PRESSURE: 155 MMHG | RESPIRATION RATE: 18 BRPM | HEART RATE: 67 BPM | DIASTOLIC BLOOD PRESSURE: 68 MMHG | TEMPERATURE: 97.1 F

## 2019-04-23 PROCEDURE — 96365 THER/PROPH/DIAG IV INF INIT: CPT

## 2019-04-23 PROCEDURE — 96361 HYDRATE IV INFUSION ADD-ON: CPT

## 2019-04-23 RX ADMIN — ONDANSETRON 8 MG: 2 INJECTION, SOLUTION INTRAMUSCULAR; INTRAVENOUS at 14:20

## 2019-04-23 RX ADMIN — SODIUM CHLORIDE 1000 ML: 0.9 INJECTION, SOLUTION INTRAVENOUS at 14:15

## 2019-04-24 ENCOUNTER — TELEPHONE (OUTPATIENT)
Dept: SURGICAL ONCOLOGY | Facility: CLINIC | Age: 84
End: 2019-04-24

## 2019-04-26 ENCOUNTER — TELEPHONE (OUTPATIENT)
Dept: INFUSION CENTER | Facility: CLINIC | Age: 84
End: 2019-04-26

## 2019-04-29 ENCOUNTER — OFFICE VISIT (OUTPATIENT)
Dept: HEMATOLOGY ONCOLOGY | Facility: CLINIC | Age: 84
End: 2019-04-29
Payer: COMMERCIAL

## 2019-04-29 VITALS
SYSTOLIC BLOOD PRESSURE: 120 MMHG | HEIGHT: 58 IN | TEMPERATURE: 96 F | BODY MASS INDEX: 25.4 KG/M2 | HEART RATE: 60 BPM | OXYGEN SATURATION: 98 % | WEIGHT: 121 LBS | RESPIRATION RATE: 18 BRPM | DIASTOLIC BLOOD PRESSURE: 90 MMHG

## 2019-04-29 DIAGNOSIS — Z17.1 MALIGNANT NEOPLASM OF UPPER-OUTER QUADRANT OF RIGHT BREAST IN FEMALE, ESTROGEN RECEPTOR NEGATIVE (HCC): Primary | ICD-10-CM

## 2019-04-29 DIAGNOSIS — C50.411 MALIGNANT NEOPLASM OF UPPER-OUTER QUADRANT OF RIGHT BREAST IN FEMALE, ESTROGEN RECEPTOR NEGATIVE (HCC): Primary | ICD-10-CM

## 2019-04-29 PROCEDURE — 99215 OFFICE O/P EST HI 40 MIN: CPT | Performed by: INTERNAL MEDICINE

## 2019-04-30 ENCOUNTER — TELEPHONE (OUTPATIENT)
Dept: SURGICAL ONCOLOGY | Facility: CLINIC | Age: 84
End: 2019-04-30

## 2019-04-30 ENCOUNTER — TELEPHONE (OUTPATIENT)
Dept: OBGYN CLINIC | Facility: CLINIC | Age: 84
End: 2019-04-30

## 2019-05-01 ENCOUNTER — TELEPHONE (OUTPATIENT)
Dept: HEMATOLOGY ONCOLOGY | Facility: CLINIC | Age: 84
End: 2019-05-01

## 2019-05-01 ENCOUNTER — TELEPHONE (OUTPATIENT)
Dept: SURGICAL ONCOLOGY | Facility: CLINIC | Age: 84
End: 2019-05-01

## 2019-05-06 ENCOUNTER — OFFICE VISIT (OUTPATIENT)
Dept: FAMILY MEDICINE CLINIC | Facility: CLINIC | Age: 84
End: 2019-05-06
Payer: COMMERCIAL

## 2019-05-06 VITALS
DIASTOLIC BLOOD PRESSURE: 80 MMHG | BODY MASS INDEX: 25.82 KG/M2 | WEIGHT: 123 LBS | HEIGHT: 58 IN | SYSTOLIC BLOOD PRESSURE: 124 MMHG

## 2019-05-06 DIAGNOSIS — C77.3 CARCINOMA OF RIGHT BREAST METASTATIC TO AXILLARY LYMPH NODE (HCC): ICD-10-CM

## 2019-05-06 DIAGNOSIS — M15.9 PRIMARY OSTEOARTHRITIS INVOLVING MULTIPLE JOINTS: ICD-10-CM

## 2019-05-06 DIAGNOSIS — I10 ESSENTIAL HYPERTENSION: ICD-10-CM

## 2019-05-06 DIAGNOSIS — C50.911 CARCINOMA OF RIGHT BREAST METASTATIC TO AXILLARY LYMPH NODE (HCC): ICD-10-CM

## 2019-05-06 DIAGNOSIS — F41.9 ANXIETY: ICD-10-CM

## 2019-05-06 DIAGNOSIS — Z66 DNR (DO NOT RESUSCITATE): ICD-10-CM

## 2019-05-06 DIAGNOSIS — E78.2 MIXED HYPERLIPIDEMIA: ICD-10-CM

## 2019-05-06 DIAGNOSIS — F41.1 GENERALIZED ANXIETY DISORDER: ICD-10-CM

## 2019-05-06 DIAGNOSIS — C50.411 MALIGNANT NEOPLASM OF UPPER-OUTER QUADRANT OF RIGHT BREAST IN FEMALE, ESTROGEN RECEPTOR NEGATIVE (HCC): Primary | ICD-10-CM

## 2019-05-06 DIAGNOSIS — Z17.1 MALIGNANT NEOPLASM OF UPPER-OUTER QUADRANT OF RIGHT BREAST IN FEMALE, ESTROGEN RECEPTOR NEGATIVE (HCC): Primary | ICD-10-CM

## 2019-05-06 PROBLEM — R51.9 ACUTE NONINTRACTABLE HEADACHE: Status: RESOLVED | Noted: 2018-12-14 | Resolved: 2019-05-06

## 2019-05-06 PROBLEM — R42 VERTIGO: Status: RESOLVED | Noted: 2018-12-28 | Resolved: 2019-05-06

## 2019-05-06 PROBLEM — R07.89 ATYPICAL CHEST PAIN: Status: RESOLVED | Noted: 2018-12-28 | Resolved: 2019-05-06

## 2019-05-06 PROCEDURE — 3079F DIAST BP 80-89 MM HG: CPT | Performed by: FAMILY MEDICINE

## 2019-05-06 PROCEDURE — 99214 OFFICE O/P EST MOD 30 MIN: CPT | Performed by: FAMILY MEDICINE

## 2019-05-06 PROCEDURE — 3074F SYST BP LT 130 MM HG: CPT | Performed by: FAMILY MEDICINE

## 2019-05-06 RX ORDER — ONDANSETRON 4 MG/1
4 TABLET, FILM COATED ORAL EVERY 8 HOURS PRN
Qty: 50 TABLET | Refills: 1 | Status: SHIPPED | OUTPATIENT
Start: 2019-05-06

## 2019-05-06 RX ORDER — LORAZEPAM 1 MG/1
1 TABLET ORAL EVERY 8 HOURS PRN
Qty: 90 TABLET | Refills: 1 | Status: SHIPPED | OUTPATIENT
Start: 2019-05-06

## 2019-05-08 ENCOUNTER — TELEPHONE (OUTPATIENT)
Dept: SURGICAL ONCOLOGY | Facility: CLINIC | Age: 84
End: 2019-05-08

## 2019-05-08 DIAGNOSIS — C77.3 CARCINOMA OF RIGHT BREAST METASTATIC TO AXILLARY LYMPH NODE (HCC): Primary | ICD-10-CM

## 2019-05-08 DIAGNOSIS — C50.911 CARCINOMA OF RIGHT BREAST METASTATIC TO AXILLARY LYMPH NODE (HCC): Primary | ICD-10-CM

## 2019-05-10 ENCOUNTER — TELEPHONE (OUTPATIENT)
Dept: SURGICAL ONCOLOGY | Facility: CLINIC | Age: 84
End: 2019-05-10

## 2019-05-16 ENCOUNTER — TELEPHONE (OUTPATIENT)
Dept: SURGICAL ONCOLOGY | Facility: CLINIC | Age: 84
End: 2019-05-16

## 2019-05-20 ENCOUNTER — OFFICE VISIT (OUTPATIENT)
Dept: PALLIATIVE MEDICINE | Facility: CLINIC | Age: 84
End: 2019-05-20
Payer: COMMERCIAL

## 2019-05-20 VITALS
SYSTOLIC BLOOD PRESSURE: 150 MMHG | HEART RATE: 59 BPM | TEMPERATURE: 98.7 F | OXYGEN SATURATION: 98 % | BODY MASS INDEX: 26.24 KG/M2 | RESPIRATION RATE: 18 BRPM | WEIGHT: 125 LBS | HEIGHT: 58 IN | DIASTOLIC BLOOD PRESSURE: 79 MMHG

## 2019-05-20 DIAGNOSIS — C50.911 CARCINOMA OF RIGHT BREAST METASTATIC TO AXILLARY LYMPH NODE (HCC): Primary | ICD-10-CM

## 2019-05-20 DIAGNOSIS — C77.3 CARCINOMA OF RIGHT BREAST METASTATIC TO AXILLARY LYMPH NODE (HCC): Primary | ICD-10-CM

## 2019-05-20 DIAGNOSIS — F41.9 ANXIETY: ICD-10-CM

## 2019-05-20 DIAGNOSIS — G62.9 NEUROPATHY: ICD-10-CM

## 2019-05-20 DIAGNOSIS — F33.9 EPISODE OF RECURRENT MAJOR DEPRESSIVE DISORDER, UNSPECIFIED DEPRESSION EPISODE SEVERITY (HCC): ICD-10-CM

## 2019-05-20 DIAGNOSIS — Z66 DNR (DO NOT RESUSCITATE): ICD-10-CM

## 2019-05-20 PROCEDURE — 99204 OFFICE O/P NEW MOD 45 MIN: CPT | Performed by: NURSE PRACTITIONER

## 2019-05-20 RX ORDER — GABAPENTIN 100 MG/1
100 CAPSULE ORAL
Qty: 30 CAPSULE | Refills: 0 | Status: SHIPPED | OUTPATIENT
Start: 2019-05-20 | End: 2019-06-05 | Stop reason: SDUPTHER

## 2019-05-20 RX ORDER — VENLAFAXINE HYDROCHLORIDE 37.5 MG/1
37.5 CAPSULE, EXTENDED RELEASE ORAL DAILY
Qty: 30 CAPSULE | Refills: 0 | Status: SHIPPED | OUTPATIENT
Start: 2019-05-20 | End: 2019-06-05 | Stop reason: SDUPTHER

## 2019-05-22 ENCOUNTER — DOCUMENTATION (OUTPATIENT)
Dept: PALLIATIVE MEDICINE | Facility: HOSPITAL | Age: 84
End: 2019-05-22

## 2019-05-22 ENCOUNTER — TELEPHONE (OUTPATIENT)
Dept: PALLIATIVE MEDICINE | Facility: CLINIC | Age: 84
End: 2019-05-22

## 2019-05-24 ENCOUNTER — TELEPHONE (OUTPATIENT)
Dept: SURGICAL ONCOLOGY | Facility: CLINIC | Age: 84
End: 2019-05-24

## 2019-06-03 ENCOUNTER — OFFICE VISIT (OUTPATIENT)
Dept: FAMILY MEDICINE CLINIC | Facility: CLINIC | Age: 84
End: 2019-06-03
Payer: COMMERCIAL

## 2019-06-03 VITALS
WEIGHT: 122 LBS | HEIGHT: 58 IN | SYSTOLIC BLOOD PRESSURE: 142 MMHG | DIASTOLIC BLOOD PRESSURE: 78 MMHG | BODY MASS INDEX: 25.61 KG/M2

## 2019-06-03 DIAGNOSIS — Z66 DNR (DO NOT RESUSCITATE): ICD-10-CM

## 2019-06-03 DIAGNOSIS — C50.911 CARCINOMA OF RIGHT BREAST METASTATIC TO AXILLARY LYMPH NODE (HCC): ICD-10-CM

## 2019-06-03 DIAGNOSIS — I10 ESSENTIAL HYPERTENSION: Primary | ICD-10-CM

## 2019-06-03 DIAGNOSIS — G62.9 NEUROPATHY: ICD-10-CM

## 2019-06-03 DIAGNOSIS — C77.3 CARCINOMA OF RIGHT BREAST METASTATIC TO AXILLARY LYMPH NODE (HCC): ICD-10-CM

## 2019-06-03 DIAGNOSIS — F41.9 ANXIETY: ICD-10-CM

## 2019-06-03 PROCEDURE — 99214 OFFICE O/P EST MOD 30 MIN: CPT | Performed by: FAMILY MEDICINE

## 2019-06-05 ENCOUNTER — SOCIAL WORK (OUTPATIENT)
Dept: PALLIATIVE MEDICINE | Facility: CLINIC | Age: 84
End: 2019-06-05
Payer: COMMERCIAL

## 2019-06-05 ENCOUNTER — OFFICE VISIT (OUTPATIENT)
Dept: PALLIATIVE MEDICINE | Facility: CLINIC | Age: 84
End: 2019-06-05
Payer: COMMERCIAL

## 2019-06-05 VITALS
HEART RATE: 53 BPM | OXYGEN SATURATION: 98 % | HEIGHT: 58 IN | WEIGHT: 122.6 LBS | RESPIRATION RATE: 18 BRPM | TEMPERATURE: 98.5 F | SYSTOLIC BLOOD PRESSURE: 140 MMHG | DIASTOLIC BLOOD PRESSURE: 74 MMHG | BODY MASS INDEX: 25.73 KG/M2

## 2019-06-05 DIAGNOSIS — F33.9 EPISODE OF RECURRENT MAJOR DEPRESSIVE DISORDER, UNSPECIFIED DEPRESSION EPISODE SEVERITY (HCC): ICD-10-CM

## 2019-06-05 DIAGNOSIS — Z17.1 MALIGNANT NEOPLASM OF UPPER-OUTER QUADRANT OF RIGHT BREAST IN FEMALE, ESTROGEN RECEPTOR NEGATIVE (HCC): Primary | ICD-10-CM

## 2019-06-05 DIAGNOSIS — C50.411 MALIGNANT NEOPLASM OF UPPER-OUTER QUADRANT OF RIGHT BREAST IN FEMALE, ESTROGEN RECEPTOR NEGATIVE (HCC): Primary | ICD-10-CM

## 2019-06-05 DIAGNOSIS — F41.9 ANXIETY: ICD-10-CM

## 2019-06-05 DIAGNOSIS — G62.9 NEUROPATHY: ICD-10-CM

## 2019-06-05 DIAGNOSIS — Z71.89 COUNSELING AND COORDINATION OF CARE: Primary | ICD-10-CM

## 2019-06-05 PROCEDURE — 99214 OFFICE O/P EST MOD 30 MIN: CPT | Performed by: NURSE PRACTITIONER

## 2019-06-05 PROCEDURE — 99024 POSTOP FOLLOW-UP VISIT: CPT

## 2019-06-05 RX ORDER — GABAPENTIN 100 MG/1
200 CAPSULE ORAL
Qty: 60 CAPSULE | Refills: 0 | Status: SHIPPED | OUTPATIENT
Start: 2019-06-05 | End: 2019-07-11 | Stop reason: SDUPTHER

## 2019-06-05 RX ORDER — ACETAMINOPHEN 325 MG/1
325 TABLET ORAL 2 TIMES DAILY
COMMUNITY

## 2019-06-05 RX ORDER — VENLAFAXINE HYDROCHLORIDE 75 MG/1
75 CAPSULE, EXTENDED RELEASE ORAL DAILY
Qty: 30 CAPSULE | Refills: 0 | Status: SHIPPED | OUTPATIENT
Start: 2019-06-05 | End: 2019-07-11 | Stop reason: SDUPTHER

## 2019-06-05 RX ORDER — SIMVASTATIN 40 MG
40 TABLET ORAL
COMMUNITY

## 2019-06-07 DIAGNOSIS — I10 ESSENTIAL HYPERTENSION: ICD-10-CM

## 2019-06-07 RX ORDER — HYDRALAZINE HYDROCHLORIDE 25 MG/1
25 TABLET, FILM COATED ORAL 2 TIMES DAILY
Qty: 180 TABLET | Refills: 1 | Status: SHIPPED | OUTPATIENT
Start: 2019-06-07

## 2019-06-07 RX ORDER — HYDRALAZINE HYDROCHLORIDE 25 MG/1
TABLET, FILM COATED ORAL
Qty: 90 TABLET | Refills: 0 | OUTPATIENT
Start: 2019-06-07

## 2019-07-01 ENCOUNTER — TELEPHONE (OUTPATIENT)
Dept: SURGICAL ONCOLOGY | Facility: CLINIC | Age: 84
End: 2019-07-01

## 2019-07-01 ENCOUNTER — TELEPHONE (OUTPATIENT)
Dept: PALLIATIVE MEDICINE | Facility: CLINIC | Age: 84
End: 2019-07-01

## 2019-07-01 DIAGNOSIS — I10 ESSENTIAL HYPERTENSION: ICD-10-CM

## 2019-07-01 RX ORDER — AMLODIPINE BESYLATE AND BENAZEPRIL HYDROCHLORIDE 10; 20 MG/1; MG/1
CAPSULE ORAL
Qty: 90 CAPSULE | Refills: 0 | OUTPATIENT
Start: 2019-07-01

## 2019-07-01 RX ORDER — AMLODIPINE BESYLATE AND BENAZEPRIL HYDROCHLORIDE 10; 20 MG/1; MG/1
1 CAPSULE ORAL DAILY
Qty: 90 CAPSULE | Refills: 3 | Status: SHIPPED | OUTPATIENT
Start: 2019-07-01

## 2019-07-01 NOTE — TELEPHONE ENCOUNTER
Patient called wanting to talk to Gemma Genao  After learning I could direct this call to Anna Gonzalez [address] she opted to want to discuss these breast issues at her next Apt with Russell Pop  She would like to talk @ breast appearance, possible other avenues of treatment(I asked her if she shared this with her Onc Dr )  She states she trusts Gemma Genao opinion

## 2019-07-01 NOTE — TELEPHONE ENCOUNTER
Breast Cancer Nurse Navigator    Patient called reporting that her breast looks awful, she can't stand looking at it  Per patient looks like a shriveled up prune  Patient has since spoken with a friend who motivated her to live and seek additional treatment, she is eager to continue to live now  Patient asking about making an appointment to speak to Dr Michoacano Pruett about surgery  She had multiple questions about possible treatment options etc   I answered all of her questions to the best of my ability but advised the most appropriate people to ask questions to are Dr Michoacano Pruett and Dr Luis Polanco in regards to possible immediate and future treatment options  Patient is nervous but open to surgery and possibly a more mild dose of chemo or different chemotherapy if this is an option  Advised patient that I will assist in getting her scheduled to discuss surgery with Dr Michoacano Pruett  Patient would like me to call her back with appointment date/time  I did make her aware that Dr Michoacano Pruett is on vacation this week but that I will call her back as soon is something is scheduled  Patient agreeable and verbalized understanding

## 2019-07-02 ENCOUNTER — TELEPHONE (OUTPATIENT)
Dept: HEMATOLOGY ONCOLOGY | Facility: CLINIC | Age: 84
End: 2019-07-02

## 2019-07-02 NOTE — TELEPHONE ENCOUNTER
Called patient on 7/1 left a message regarding an appointment she requested- made her an appointment for 8/9 at 9:30am  Stated that if this date and or time does not work for patient to give us a call back

## 2019-07-03 ENCOUNTER — TELEPHONE (OUTPATIENT)
Dept: SURGICAL ONCOLOGY | Facility: CLINIC | Age: 84
End: 2019-07-03

## 2019-07-03 NOTE — TELEPHONE ENCOUNTER
Breast Cancer Nurse Navigator    Confirmed with patient that she is aware of scheduled appointment with Dr Queta Jamil on 08/09/19 at 0930, pt did receive the message and is aware  Also informed that Dr Adama Blevins schedule is booked for the next few weeks and her nurse Claudetta Hoist will speak to her on Monday and update her on changes and see when Dr Raji Fry can add her to the schedule to discuss testing and treatment options  Patient verbalized understanding and will wait for a call next week with an appointment to see Dr Raji Fry  I will continue to follow up with patient as needed

## 2019-07-08 ENCOUNTER — TELEPHONE (OUTPATIENT)
Dept: SURGICAL ONCOLOGY | Facility: CLINIC | Age: 84
End: 2019-07-08

## 2019-07-08 NOTE — TELEPHONE ENCOUNTER
----- Message from Leticia Yung MD sent at 7/6/2019 11:04 AM EDT -----  We can give her 830 on the 17th

## 2019-07-08 NOTE — TELEPHONE ENCOUNTER
Called and spoke to patient, informed that Dr Milli Mccracken has scheduled her for an appointment on 07/17/19 at [address] [facility] [place], operated by [facility] at the [address] [place] [address]  Patient confirmed she is able to make the appointment as scheduled as her appointment with Palliative Care is not until 2:20 pm same day  Asked patient if anything changes and she is unable to make appointment to call and let me  know  Patient agreeable  I will continue to follow up with patient as needed

## 2019-07-11 DIAGNOSIS — G62.9 NEUROPATHY: ICD-10-CM

## 2019-07-11 DIAGNOSIS — F41.9 ANXIETY: ICD-10-CM

## 2019-07-11 DIAGNOSIS — F33.9 EPISODE OF RECURRENT MAJOR DEPRESSIVE DISORDER, UNSPECIFIED DEPRESSION EPISODE SEVERITY (HCC): ICD-10-CM

## 2019-07-11 RX ORDER — GABAPENTIN 100 MG/1
CAPSULE ORAL
Qty: 60 CAPSULE | Refills: 0 | Status: SHIPPED | OUTPATIENT
Start: 2019-07-11 | End: 2019-07-17 | Stop reason: SDUPTHER

## 2019-07-11 RX ORDER — VENLAFAXINE HYDROCHLORIDE 75 MG/1
CAPSULE, EXTENDED RELEASE ORAL
Qty: 30 CAPSULE | Refills: 0 | Status: SHIPPED | OUTPATIENT
Start: 2019-07-11 | End: 2019-07-17 | Stop reason: SDUPTHER

## 2019-07-17 ENCOUNTER — DOCUMENTATION (OUTPATIENT)
Dept: SURGICAL ONCOLOGY | Facility: CLINIC | Age: 84
End: 2019-07-17

## 2019-07-17 ENCOUNTER — OFFICE VISIT (OUTPATIENT)
Dept: PALLIATIVE MEDICINE | Facility: CLINIC | Age: 84
End: 2019-07-17
Payer: COMMERCIAL

## 2019-07-17 ENCOUNTER — SOCIAL WORK (OUTPATIENT)
Dept: PALLIATIVE MEDICINE | Facility: CLINIC | Age: 84
End: 2019-07-17
Payer: COMMERCIAL

## 2019-07-17 ENCOUNTER — OFFICE VISIT (OUTPATIENT)
Dept: SURGICAL ONCOLOGY | Facility: CLINIC | Age: 84
End: 2019-07-17
Payer: COMMERCIAL

## 2019-07-17 VITALS
HEART RATE: 56 BPM | RESPIRATION RATE: 18 BRPM | OXYGEN SATURATION: 99 % | DIASTOLIC BLOOD PRESSURE: 70 MMHG | SYSTOLIC BLOOD PRESSURE: 150 MMHG

## 2019-07-17 VITALS
BODY MASS INDEX: 24.37 KG/M2 | WEIGHT: 115.8 LBS | OXYGEN SATURATION: 91 % | HEART RATE: 56 BPM | RESPIRATION RATE: 18 BRPM | DIASTOLIC BLOOD PRESSURE: 70 MMHG | SYSTOLIC BLOOD PRESSURE: 150 MMHG

## 2019-07-17 VITALS
SYSTOLIC BLOOD PRESSURE: 130 MMHG | TEMPERATURE: 97.4 F | BODY MASS INDEX: 23.85 KG/M2 | WEIGHT: 113.6 LBS | HEIGHT: 58 IN | HEART RATE: 68 BPM | RESPIRATION RATE: 16 BRPM | DIASTOLIC BLOOD PRESSURE: 80 MMHG

## 2019-07-17 DIAGNOSIS — Z71.89 COUNSELING AND COORDINATION OF CARE: Primary | ICD-10-CM

## 2019-07-17 DIAGNOSIS — F41.9 ANXIETY: ICD-10-CM

## 2019-07-17 DIAGNOSIS — C50.411 MALIGNANT NEOPLASM OF UPPER-OUTER QUADRANT OF RIGHT BREAST IN FEMALE, ESTROGEN RECEPTOR NEGATIVE (HCC): ICD-10-CM

## 2019-07-17 DIAGNOSIS — Z17.1 MALIGNANT NEOPLASM OF UPPER-OUTER QUADRANT OF RIGHT BREAST IN FEMALE, ESTROGEN RECEPTOR NEGATIVE (HCC): ICD-10-CM

## 2019-07-17 DIAGNOSIS — Z17.1 MALIGNANT NEOPLASM OF UPPER-OUTER QUADRANT OF RIGHT BREAST IN FEMALE, ESTROGEN RECEPTOR NEGATIVE (HCC): Primary | ICD-10-CM

## 2019-07-17 DIAGNOSIS — G62.9 NEUROPATHY: ICD-10-CM

## 2019-07-17 DIAGNOSIS — F33.9 EPISODE OF RECURRENT MAJOR DEPRESSIVE DISORDER, UNSPECIFIED DEPRESSION EPISODE SEVERITY (HCC): ICD-10-CM

## 2019-07-17 DIAGNOSIS — C50.411 MALIGNANT NEOPLASM OF UPPER-OUTER QUADRANT OF RIGHT BREAST IN FEMALE, ESTROGEN RECEPTOR NEGATIVE (HCC): Primary | ICD-10-CM

## 2019-07-17 DIAGNOSIS — C50.911 CARCINOMA OF RIGHT BREAST METASTATIC TO AXILLARY LYMPH NODE (HCC): Primary | ICD-10-CM

## 2019-07-17 DIAGNOSIS — C77.3 CARCINOMA OF RIGHT BREAST METASTATIC TO AXILLARY LYMPH NODE (HCC): Primary | ICD-10-CM

## 2019-07-17 PROCEDURE — 99214 OFFICE O/P EST MOD 30 MIN: CPT | Performed by: NURSE PRACTITIONER

## 2019-07-17 PROCEDURE — 99215 OFFICE O/P EST HI 40 MIN: CPT | Performed by: SURGERY

## 2019-07-17 PROCEDURE — NC001 PR NO CHARGE

## 2019-07-17 RX ORDER — VENLAFAXINE HYDROCHLORIDE 75 MG/1
75 CAPSULE, EXTENDED RELEASE ORAL DAILY
Qty: 30 CAPSULE | Refills: 0 | Status: SHIPPED | OUTPATIENT
Start: 2019-07-17 | End: 2019-08-12 | Stop reason: SDUPTHER

## 2019-07-17 RX ORDER — GABAPENTIN 300 MG/1
300 CAPSULE ORAL
Qty: 30 CAPSULE | Refills: 0 | Status: SHIPPED | OUTPATIENT
Start: 2019-07-17 | End: 2019-08-12 | Stop reason: SDUPTHER

## 2019-07-17 NOTE — PROGRESS NOTES
Breast Cancer Nurse Navigator    Accompanied patient for her appointment with Dr Kirt Carrera  Spent a significant amount of time with patient providing education, answering questions and explaining process and plan on how potential treatments may move forward  Patient with progression of disease, patient asked several times after Dr Kirt Carrera explained in detail at the time with her evaluation of right breast cancer and its progression to skin that surgery would be a major surgery requiring plastic surgery to take skin from other areas of her body to close the wound  After explaining several times patient understood  Also discussed possible role of chemotherapy at a lower dose to possibly shrink the tumor and allow Dr Kirt Carrera to complete surgery that may be slightly less dramatic  Assured patient understood that if ordered scans show disease metastasized that surgery would not be an option and she could choose to try chemotherapy at a lower dose at an attempt to slow down progression or she can also choose to do nothing and continue palliative care until needs to transition to end of life care  Patient did verbalize understanding  She is accepting of death but is eager to prolong life if cancer can be controlled and can maintain quality of life  Discussed hydration and optimizing nutrition for overall energy needs and especially is surgery is done as she will need adequate nutrition and energy stores for healing process  Patient verbalized understanding and is aware of upcoming appointment and CT scan ordered  I will continue to follow up with patient as needed

## 2019-07-17 NOTE — LETTER
July 17, 2019     Anisha Frazier MD  [address] [facility]  [place] [address]    Patient: Jordan Read   YOB: 1935   Date of Visit: 7/17/2019       Dear Dr Luis Polanco:    Thank you for referring Michelle Else to me for evaluation  Below are my notes for this consultation  If you have questions, please do not hesitate to call me  I look forward to following your patient along with you  Sincerely,        Trini Sandra MD        CC: No Recipients  Trini Sandra MD  7/17/2019  9:37 AM  Sign at close encounter     Surgical Oncology Follow Up       [address]  1935  [id number]  [address] [facility] SURGICAL ONCOLOGY [facility]    Chief Complaint   Patient presents with    Follow-up       Assessment/Plan   Diagnoses and all orders for this visit:    Carcinoma of right breast 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 ([address] )  -     CT chest abdomen pelvis wo contrast; Future  -     CT head wo contrast; Future  -     NM bone scan whole body; Future    Malignant neoplasm of upper-outer quadrant of right breast in female, estrogen receptor negative ([address] )  -     CT chest abdomen pelvis wo contrast; Future  -     CT head wo contrast; Future  -     NM bone scan whole body; Future        Advance Care Planning/Advance Directives:  Discussed disease status, cancer treatment plans and/or cancer treatment goals with the patient  Oncology History:     No history exists  History of Present Illness:  Right breast carcinoma  -Interval History:  Patient declined further treatment but is now returning today to discuss this further    Review of Systems:  Review of Systems   Constitutional: Positive for appetite change  Negative for fever  Eyes: Negative  Respiratory: Negative for shortness of breath      Cardiovascular: Negative  Gastrointestinal: Positive for nausea  Endocrine: Negative  Genitourinary: Negative  Musculoskeletal: Negative  Negative for arthralgias and myalgias  Skin: Positive for wound (right breast)  Allergic/Immunologic: Negative  Neurological: Positive for dizziness, weakness and headaches  Hematological: Negative  Negative for adenopathy  Does not bruise/bleed easily  Psychiatric/Behavioral: Positive for hallucinations (tactile)  Patient Active Problem List   Diagnosis    Osteoarthritis    Osteoporosis    Mixed hyperlipidemia    Essential hypertension    Anxiety    Bilateral hip pain    Skin tag    Aortic heart murmur    Malignant neoplasm of upper-outer quadrant of right female breast ([facility]    Carcinoma of right breast 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 ([facility] )    DNR (do not resuscitate)    Episode of recurrent major depressive disorder ([facility] )    Neuropathy     Past Medical History:   Diagnosis Date    Ataxia     last assessed 2/16/2017    Hypercholesteremia     Hypertension     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    Murmur     last assessed 5/22/2012    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    Right carotid bruit     last assessed 5/22/2012     Past Surgical History:   Procedure Laterality Date    BREAST BIOPSY Right 02/14/2019    [facility]    right axillary biopsy - mets    COLONOSCOPY  2007    TONSILLECTOMY AND ADENOIDECTOMY      TOTAL ABDOMINAL HYSTERECTOMY      US GUIDED BREAST BIOPSY RIGHT COMPLETE Right 2/14/2019    US GUIDED BREAST LYMPH NODE BIOPSY RIGHT Right 2/14/2019     Family History   Problem Relation Age of Onset    No Known Problems Mother     Other Family         CABG    Hypertension Family     Transient ischemic attack Family      Social History     Socioeconomic History    Marital status: Single     Spouse name: Not on file    Number of children: Not on file    Years of education: Not on file    Highest education level: Not on file Occupational History    Not on file   Social Needs    Financial resource strain: Not on file    Food insecurity:     Worry: Not on file     Inability: Not on file    Transportation needs:     Medical: Not on file     Non-medical: Not on file   Tobacco Use    Smoking status: Former Smoker    Smokeless tobacco: Never Used    Tobacco comment: quit in 1980   Substance and Sexual Activity    Alcohol use: No     Comment: history    Drug use: No    Sexual activity: Not on file   Lifestyle    Physical activity:     Days per week: Not on file     Minutes per session: Not on file    Stress: Not on file   Relationships    Social connections:     Talks on phone: Not on file     Gets together: Not on file     Attends Yazidism service: Not on file     Active member of club or organization: Not on file     Attends meetings of clubs or organizations: Not on file     Relationship status: Not on file    Intimate partner violence:     Fear of current or ex partner: Not on file     Emotionally abused: Not on file     Physically abused: Not on file     Forced sexual activity: Not on file   Other Topics Concern    Not on file   Social History Narrative    Denied:  History of housing without smoke detectors    Uses safety equipment - seatbelts       Current Outpatient Medications:     acetaminophen (TYLENOL) 325 mg tablet, Take 325 mg by mouth 2 (two) times a day, Disp: , Rfl:     amLODIPine-benazepril (LOTREL) 10-20 MG per capsule, Take 1 capsule by mouth daily, Disp: 90 capsule, Rfl: 3    aspirin 81 MG tablet, Take 1 tablet by mouth daily, Disp: , Rfl:     Calcium-Magnesium 333-167 MG TABS, Take by mouth, Disp: , Rfl:     Cholecalciferol (VITAMIN D3) 1000 units CAPS, Take 400 Units by mouth  , Disp: , Rfl:     gabapentin (NEURONTIN) 100 mg capsule, TAKE 2 CAPSULES BY MOUTH ONCE DAILY AT BEDTIME, Disp: 60 capsule, Rfl: 0    hydrALAZINE (APRESOLINE) 25 mg tablet, Take 1 tablet (25 mg total) by mouth 2 (two) times a day, Disp: 180 tablet, Rfl: 1    LORazepam (ATIVAN) 1 mg tablet, Take 1 tablet (1 mg total) by mouth every 8 (eight) hours as needed for anxiety, Disp: 90 tablet, Rfl: 1    LUMIGAN 0 01 % ophthalmic drops, , Disp: , Rfl:     meclizine (ANTIVERT) 25 mg tablet, Take 1 tablet (25 mg total) by mouth every 8 (eight) hours as needed for dizziness, Disp: 50 tablet, Rfl: 0    metoprolol tartrate (LOPRESSOR) 100 mg tablet, Take 1 tablet (100 mg total) by mouth every 12 (twelve) hours, Disp: 180 tablet, Rfl: 3    Multiple Vitamins-Minerals (WOMENS 50+ MULTI VITAMIN/MIN PO), Take by mouth, Disp: , Rfl:     ondansetron (ZOFRAN) 4 mg tablet, Take 1 tablet (4 mg total) by mouth every 8 (eight) hours as needed for nausea or vomiting, Disp: 50 tablet, Rfl: 1    simvastatin (ZOCOR) 40 mg tablet, Take 40 mg by mouth daily at bedtime, Disp: , Rfl:     venlafaxine (EFFEXOR-XR) 75 mg 24 hr capsule, TAKE 1 CAPSULE BY MOUTH ONCE DAILY, Disp: 30 capsule, Rfl: 0  Allergies   Allergen Reactions    Hydrocodone-Acetaminophen Dizziness and Fatigue       The following portions of the patient's history were reviewed and updated as appropriate: allergies, current medications, past family history, past medical history, past social history, past surgical history and problem list         Vitals:    07/17/19 0835   BP: 130/80   Pulse: 68   Resp: 16   Temp: (!) 97 4 °F (36 3 °C)       Physical Exam   Constitutional: She appears well-developed and well-nourished  Cardiovascular: Regular rhythm  Pulmonary/Chest: Breath sounds normal  Right breast exhibits inverted nipple (Retracted), mass and skin change ( near fungating lesion, multiple skin metastases that extend superior to several cm below the clavicle, medial crossing the midline into the left breast and to the inferior breast)  Right breast exhibits no nipple discharge and no tenderness   Left breast exhibits no inverted nipple, no mass, no nipple discharge, no skin change and no tenderness  Large central mass with beginning skin breakdown       Abdominal: Soft  Lymphadenopathy:     She has axillary adenopathy  Right axillary: Pectoral and lateral ( fixed) adenopathy present  Left axillary: No pectoral and no lateral adenopathy present  Right: No supraclavicular adenopathy present  Left: No supraclavicular adenopathy present  Neurological: She is alert  Psychiatric:   Inappropriate affect           Discussion/Summary:  26-year-old female here today with at least a locally advanced carcinoma of the right breast   This is triple negative in status  She presented with an inflammatory carcinoma with lymph node involvement  She was referred for neoadjuvant chemotherapy  She had a total of two cycles and decided to discontinue therapy secondary to side effects  She was referred back to me for potential surgical management  She had canceled that appointment and stated she was not going to pursue any additional care  There was also conversation with the patient's son  She had continued to communicate with Johnathon Geiger, our nurse navigator  She now re-presents today because she wants to discuss possible surgery  She also has a follow-up appointment scheduled with Dr Parrish [place] for next month  On examination today, she has extensive involvement which has progressed since my last examination with her  At that time she had a large mass with inflammatory changes and lymph node involvement  She now has beginning skin breakdown  She also has multiple skin metastases  These extend inferior, lateral, superior and medial in the breast   The medial lesions extend beyond the midline into the contralateral breast   She also is complaining of a multitude of symptoms    She has a poor appetite, nausea, dizziness, disequilibrium, pain, nerve pain and reports unusual tactile hallucinations where she feels like somebody is above her then is touching her and squeezing her   I attempted to have a transparent conversation with her today about the gravity of her disease  Her affect however is quite bizarre as she listens but is laughing at periods  Edyta Otero was present with me during the exam and conversation and will help coordinate her care as well  Jessica Chacon did not have any friends or family with her today  I discussed repeat staging given over three months of no treatment for what is an aggressive carcinoma in addition to her multitude of new symptoms  I will repeat present her at our tumor board  Her medical oncologist should be present for this conversation as well  Should any surgery be entertained, I informed her this would be a major surgery that would involve plastic surgery as well for wound closure which would likely need to be a flap procedure from elsewhere in the body  I will make arrangements for her CT scan of the chest, abdomen, pelvis and head  I will also repeat her bone scan for full staging  Further recommendations will be made at that time along with the recommendations from the tumor board

## 2019-07-17 NOTE — PROGRESS NOTES
Palliative and Supportive Care   Briana Dockery 82 y o  female [id number]    Assessment/Plan:  1  Malignant neoplasm of upper-outer quadrant of right breast in female, estrogen receptor negative ([facility] )    2  Neuropathy    3  Anxiety    4  Episode of recurrent major depressive disorder, unspecified depression episode severity (HCC)        Requested Prescriptions     Signed Prescriptions Disp Refills    gabapentin (NEURONTIN) 300 mg capsule 30 capsule 0     Sig: Take 1 capsule (300 mg total) by mouth daily at bedtime    venlafaxine (EFFEXOR-XR) 75 mg 24 hr capsule 30 capsule 0     Sig: Take 1 capsule (75 mg total) by mouth daily     Orders Placed This Encounter   Procedures    Ambulatory referral to Psychiatry       Symptom management: unusual pains/ sensations of pressure/ heaviness of entire body  - increase gabapentin to 300mg q HS    Breast pain: increase tylenol (instructions provided)  If worsens, will consider use of opioids    Mental health- long history of depression, anxiety, suicidality  - venlafaxine XR 75mg daily reordered, will consider increasing in near future  - denies active suicidality today  - Crisis number for [facility] provided  - Patient encouraged to call 911 or go to ER if she is feeling actively suicidal   - Will place referral for counseling  Goals of care: patient now interested in treatment focused plan of care, following up with oncology and will have more scans done      Follow up in one month      Subjective    Chief Concern  Follow up visit for:  Symptom management         History of Present Illness  Patient ID: Briana Dockery is a 82 y o  female with right breast cancer diagnosed Feb 2019, found to be locally advanced, triple negative  She underwent two cycles of Taxotere and cyclophosphamide  Decided not to continue chemotherapy or undergo mastectomy or further cancer treatments  Presents to visit alone today  She has one son who lives locally   She lives alone in an apartment  Still having generalized pains, described as pressure or heaviness starting in head, but also in arms, legs down to feet  Worst when she is at home in her apartment and in her previous apartment  Worse with walking or standing, better with lying or siting  Mild improvement since starting gabapentin  Also having intermittent shooting pains in right breast, which has been worse recently  Takes PRN tylenol which helps a little  Intermittent nausea over past week  Found gabapentin helped with difficulty sleeping  Was tolerating venlafaxine ok  Ran out of gabapentin and venlafaxine about one week ago  Pharmacy did not receive e-scripted refills  Feeling very anxious and overwhelmed lately but idea of further cancer treatments, tests, etc  Describes again frequent suicidal ideation  States 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 or shooting herself if she had a gun, but she does not have a gun  Denies active suicidality  The following portions of the patient's history were reviewed and updated as appropriate: allergies, current medications, past family history, past medical history, past social history, past surgical history and problem list       Visit Information    Accompanied By: No one  Source of History: Self  History Limitations: None    ROS  Review of Systems   Constitutional: Negative  Respiratory: Negative for shortness of breath  Neurological: Positive for headaches (pressure of head, traveling down entire body)  Psychiatric/Behavioral: Positive for dysphoric mood and suicidal ideas  The patient is nervous/anxious  Objective     Physical Exam   Constitutional: She is oriented to person, place, and time  She is cooperative  Pulmonary/Chest: Effort normal    Neurological: She is alert and oriented to person, place, and time  Skin: Skin is warm and dry  Psychiatric: Her mood appears anxious  Cognition and memory are normal  She exhibits a depressed mood  /70 (BP Location: Left arm, Cuff Size: Standard)   Pulse 56   Resp 18   SpO2 99%         Current Outpatient Medications:     acetaminophen (TYLENOL) 325 mg tablet, Take 325 mg by mouth 2 (two) times a day, Disp: , Rfl:     amLODIPine-benazepril (LOTREL) 10-20 MG per capsule, Take 1 capsule by mouth daily, Disp: 90 capsule, Rfl: 3    aspirin 81 MG tablet, Take 1 tablet by mouth daily, Disp: , Rfl:     Calcium-Magnesium 333-167 MG TABS, Take by mouth, Disp: , Rfl:     Cholecalciferol (VITAMIN D3) 1000 units CAPS, Take 400 Units by mouth  , Disp: , Rfl:     gabapentin (NEURONTIN) 300 mg capsule, Take 1 capsule (300 mg total) by mouth daily at bedtime, Disp: 30 capsule, Rfl: 0    hydrALAZINE (APRESOLINE) 25 mg tablet, Take 1 tablet (25 mg total) by mouth 2 (two) times a day, Disp: 180 tablet, Rfl: 1    LORazepam (ATIVAN) 1 mg tablet, Take 1 tablet (1 mg total) by mouth every 8 (eight) hours as needed for anxiety, Disp: 90 tablet, Rfl: 1    LUMIGAN 0 01 % ophthalmic drops, , Disp: , Rfl:     meclizine (ANTIVERT) 25 mg tablet, Take 1 tablet (25 mg total) by mouth every 8 (eight) hours as needed for dizziness, Disp: 50 tablet, Rfl: 0    metoprolol tartrate (LOPRESSOR) 100 mg tablet, Take 1 tablet (100 mg total) by mouth every 12 (twelve) hours, Disp: 180 tablet, Rfl: 3    Multiple Vitamins-Minerals (WOMENS 50+ MULTI VITAMIN/MIN PO), Take by mouth, Disp: , Rfl:     ondansetron (ZOFRAN) 4 mg tablet, Take 1 tablet (4 mg total) by mouth every 8 (eight) hours as needed for nausea or vomiting, Disp: 50 tablet, Rfl: 1    simvastatin (ZOCOR) 40 mg tablet, Take 40 mg by mouth daily at bedtime, Disp: , Rfl:     venlafaxine (EFFEXOR-XR) 75 mg 24 hr capsule, Take 1 capsule (75 mg total) by mouth daily, Disp: 30 capsule, Rfl: 0      Eugenia Nina, [address] [facility] [address] and Supportive Care  [phone]

## 2019-07-17 NOTE — PROGRESS NOTES
LCSW visited with patient for a follow up visit  Discussed: - Scans/tests and possible surgery     - Considering these options     - Expressed a wish of doing this sooner    - [facility]     - Did follow thru with calling them     - Considering a facility in [facility] that can transition from AL to Skilled    - Communication/support with family    - Any other concerns/needs    Patient was alert and oriented  Patient expressed worry for treatment and options, and possible being too late  Patient was in okay spirits and recognized that her fate is what it is but would like to be around longer for her family  Patient was appreciative of the support  Patient continues to live alone but is reasonable about the future and planning, considering long term living arrangements  LCSW will continue to offer support as needed and accepted

## 2019-07-17 NOTE — PATIENT INSTRUCTIONS
· [facility] [phone]  Please call if you are having suicidal thoughts    You can also always call 911 or go to the ER if you are feeling actively suicidal     Continue venlafaxine (Effexor XR) 75mg once daily [for anxiety and depression]    Gabapentin will be increased to 300mg at bedtime- one tab only [for nerve pains]    - acetaminophen (Tylenol) - ok to take up to 3 tabs of 325mg each three times daily for pain [max 9 tabs daily]  (if you buy extra-strength (500mg each) - ok to take 2 tabs of 500mg each three times daily [max 6 tabs daily]    - call if pain is not controlled - we can discuss stronger pain med

## 2019-07-17 NOTE — PROGRESS NOTES
Surgical Oncology Follow Up       [facility]  [facility] SURGICAL ONCOLOGY [facility] [place] [address]    Kizzy Lay  1935  [id number]  [address]  [facility] SURGICAL ONCOLOGY [place]  [place]  [place] [address]    Chief Complaint   Patient presents with    Follow-up       Assessment/Plan   Diagnoses and all orders for this visit:    Carcinoma of right breast 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 ([address] )  -     CT chest abdomen pelvis wo contrast; Future  -     CT head wo contrast; Future  -     NM bone scan whole body; Future    Malignant neoplasm of upper-outer quadrant of right breast in female, estrogen receptor negative ([address] )  -     CT chest abdomen pelvis wo contrast; Future  -     CT head wo contrast; Future  -     NM bone scan whole body; Future        Advance Care Planning/Advance Directives:  Discussed disease status, cancer treatment plans and/or cancer treatment goals with the patient  Oncology History:     No history exists  History of Present Illness:  Right breast carcinoma  -Interval History:  Patient declined further treatment but is now returning today to discuss this further    Review of Systems:  Review of Systems   Constitutional: Positive for appetite change  Negative for fever  Eyes: Negative  Respiratory: Negative for shortness of breath  Cardiovascular: Negative  Gastrointestinal: Positive for nausea  Endocrine: Negative  Genitourinary: Negative  Musculoskeletal: Negative  Negative for arthralgias and myalgias  Skin: Positive for wound (right breast)  Allergic/Immunologic: Negative  Neurological: Positive for dizziness, weakness and headaches  Hematological: Negative  Negative for adenopathy  Does not bruise/bleed easily  Psychiatric/Behavioral: Positive for hallucinations (tactile)         Patient Active Problem List   Diagnosis    Osteoarthritis    Osteoporosis    Mixed hyperlipidemia    Essential hypertension    Anxiety    Bilateral hip pain    Skin tag    Aortic heart murmur    Malignant neoplasm of upper-outer quadrant of right female breast (HCC)    Carcinoma of right breast 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 ([facility] )    DNR (do not resuscitate)    Episode of recurrent major depressive disorder ([facility] )    Neuropathy     Past Medical History:   Diagnosis Date    Ataxia     last assessed 2/16/2017    Hypercholesteremia     Hypertension     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    Murmur     last assessed 5/22/2012    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    Right carotid bruit     last assessed 5/22/2012     Past Surgical History:   Procedure Laterality Date    BREAST BIOPSY Right 02/14/2019    [facility]    right axillary biopsy - mets    COLONOSCOPY  2007    TONSILLECTOMY AND ADENOIDECTOMY      TOTAL ABDOMINAL HYSTERECTOMY      US GUIDED BREAST BIOPSY RIGHT COMPLETE Right 2/14/2019    US GUIDED BREAST LYMPH NODE BIOPSY RIGHT Right 2/14/2019     Family History   Problem Relation Age of Onset    No Known Problems Mother     Other Family         CABG    Hypertension Family     Transient ischemic attack Family      Social History     Socioeconomic History    Marital status: Single     Spouse name: Not on file    Number of children: Not on file    Years of education: Not on file    Highest education level: Not on file   Occupational History    Not on file   Social Needs    Financial resource strain: Not on file    Food insecurity:     Worry: Not on file     Inability: Not on file    Transportation needs:     Medical: Not on file     Non-medical: Not on file   Tobacco Use    Smoking status: Former Smoker    Smokeless tobacco: Never Used    Tobacco comment: quit in 1980   Substance and Sexual Activity    Alcohol use: No     Comment: history    Drug use: No    Sexual activity: Not on file   Lifestyle    Physical activity:     Days per week: Not on file Minutes per session: Not on file    Stress: Not on file   Relationships    Social connections:     Talks on phone: Not on file     Gets together: Not on file     Attends Sikhism service: Not on file     Active member of club or organization: Not on file     Attends meetings of clubs or organizations: Not on file     Relationship status: Not on file    Intimate partner violence:     Fear of current or ex partner: Not on file     Emotionally abused: Not on file     Physically abused: Not on file     Forced sexual activity: Not on file   Other Topics Concern    Not on file   Social History Narrative    Denied:  History of housing without smoke detectors    Uses safety equipment - seatbelts       Current Outpatient Medications:     acetaminophen (TYLENOL) 325 mg tablet, Take 325 mg by mouth 2 (two) times a day, Disp: , Rfl:     amLODIPine-benazepril (LOTREL) 10-20 MG per capsule, Take 1 capsule by mouth daily, Disp: 90 capsule, Rfl: 3    aspirin 81 MG tablet, Take 1 tablet by mouth daily, Disp: , Rfl:     Calcium-Magnesium 333-167 MG TABS, Take by mouth, Disp: , Rfl:     Cholecalciferol (VITAMIN D3) 1000 units CAPS, Take 400 Units by mouth  , Disp: , Rfl:     gabapentin (NEURONTIN) 100 mg capsule, TAKE 2 CAPSULES BY MOUTH ONCE DAILY AT BEDTIME, Disp: 60 capsule, Rfl: 0    hydrALAZINE (APRESOLINE) 25 mg tablet, Take 1 tablet (25 mg total) by mouth 2 (two) times a day, Disp: 180 tablet, Rfl: 1    LORazepam (ATIVAN) 1 mg tablet, Take 1 tablet (1 mg total) by mouth every 8 (eight) hours as needed for anxiety, Disp: 90 tablet, Rfl: 1    LUMIGAN 0 01 % ophthalmic drops, , Disp: , Rfl:     meclizine (ANTIVERT) 25 mg tablet, Take 1 tablet (25 mg total) by mouth every 8 (eight) hours as needed for dizziness, Disp: 50 tablet, Rfl: 0    metoprolol tartrate (LOPRESSOR) 100 mg tablet, Take 1 tablet (100 mg total) by mouth every 12 (twelve) hours, Disp: 180 tablet, Rfl: 3    Multiple Vitamins-Minerals (WOMENS 50+ MULTI VITAMIN/MIN PO), Take by mouth, Disp: , Rfl:     ondansetron (ZOFRAN) 4 mg tablet, Take 1 tablet (4 mg total) by mouth every 8 (eight) hours as needed for nausea or vomiting, Disp: 50 tablet, Rfl: 1    simvastatin (ZOCOR) 40 mg tablet, Take 40 mg by mouth daily at bedtime, Disp: , Rfl:     venlafaxine (EFFEXOR-XR) 75 mg 24 hr capsule, TAKE 1 CAPSULE BY MOUTH ONCE DAILY, Disp: 30 capsule, Rfl: 0  Allergies   Allergen Reactions    Hydrocodone-Acetaminophen Dizziness and Fatigue       The following portions of the patient's history were reviewed and updated as appropriate: allergies, current medications, past family history, past medical history, past social history, past surgical history and problem list         Vitals:    07/17/19 0835   BP: 130/80   Pulse: 68   Resp: 16   Temp: (!) 97 4 °F (36 3 °C)       Physical Exam   Constitutional: She appears well-developed and well-nourished  Cardiovascular: Regular rhythm  Pulmonary/Chest: Breath sounds normal  Right breast exhibits inverted nipple (Retracted), mass and skin change ( near fungating lesion, multiple skin metastases that extend superior to several cm below the clavicle, medial crossing the midline into the left breast and to the inferior breast)  Right breast exhibits no nipple discharge and no tenderness  Left breast exhibits no inverted nipple, no mass, no nipple discharge, no skin change and no tenderness  Large central mass with beginning skin breakdown       Abdominal: Soft  Lymphadenopathy:     She has axillary adenopathy  Right axillary: Pectoral and lateral ( fixed) adenopathy present  Left axillary: No pectoral and no lateral adenopathy present  Right: No supraclavicular adenopathy present  Left: No supraclavicular adenopathy present  Neurological: She is alert     Psychiatric:   Inappropriate affect           Discussion/Summary:  82-year-old female here today with at least a locally advanced carcinoma of the right breast   This is triple negative in status  She presented with an inflammatory carcinoma with lymph node involvement  She was referred for neoadjuvant chemotherapy  She had a total of two cycles and decided to discontinue therapy secondary to side effects  She was referred back to me for potential surgical management  She had canceled that appointment and stated she was not going to pursue any additional care  There was also conversation with the patient's son  She had continued to communicate with Kishore Sanches, our nurse navigator  She now re-presents today because she wants to discuss possible surgery  She also has a follow-up appointment scheduled with Dr Lobito Javier for next month  On examination today, she has extensive involvement which has progressed since my last examination with her  At that time she had a large mass with inflammatory changes and lymph node involvement  She now has beginning skin breakdown  She also has multiple skin metastases  These extend inferior, lateral, superior and medial in the breast   The medial lesions extend beyond the midline into the contralateral breast   She also is complaining of a multitude of symptoms  She has a poor appetite, nausea, dizziness, disequilibrium, pain, nerve pain and reports unusual tactile hallucinations where she feels like somebody is above her then is touching her and squeezing her  I attempted to have a transparent conversation with her today about the gravity of her disease  Her affect however is quite bizarre as she listens but is laughing at periods  Susu Sadler was present with me during the exam and conversation and will help coordinate her care as well  Milly Mendez did not have any friends or family with her today  I discussed repeat staging given over three months of no treatment for what is an aggressive carcinoma in addition to her multitude of new symptoms  I will repeat present her at our tumor board    Her medical oncologist should be present for this conversation as well  Should any surgery be entertained, I informed her this would be a major surgery that would involve plastic surgery as well for wound closure which would likely need to be a flap procedure from elsewhere in the body  I will make arrangements for her CT scan of the chest, abdomen, pelvis and head  I will also repeat her bone scan for full staging  Further recommendations will be made at that time along with the recommendations from the tumor board

## 2019-07-18 ENCOUNTER — TELEPHONE (OUTPATIENT)
Dept: PALLIATIVE MEDICINE | Facility: CLINIC | Age: 84
End: 2019-07-18

## 2019-07-18 ENCOUNTER — TELEPHONE (OUTPATIENT)
Dept: HEMATOLOGY ONCOLOGY | Facility: CLINIC | Age: 84
End: 2019-07-18

## 2019-07-18 NOTE — TELEPHONE ENCOUNTER
Called patient on 7/18 left a message stating that I understand her 8/9 appointment with dr faulkner needed to be rescheduled due to testing and or results will not be back in time for that appointment  Patient's new appointment is for 9/5 at 10:20am  Stated that if this date and or time does not work for patient to please give our office a call

## 2019-07-22 ENCOUNTER — DOCUMENTATION (OUTPATIENT)
Dept: PALLIATIVE MEDICINE | Facility: HOSPITAL | Age: 84
End: 2019-07-22

## 2019-07-22 ENCOUNTER — TELEPHONE (OUTPATIENT)
Dept: SURGICAL ONCOLOGY | Facility: CLINIC | Age: 84
End: 2019-07-22

## 2019-07-22 NOTE — TELEPHONE ENCOUNTER
Received return call from patient  Patient is agreeable to and prefers to have appointments for CT scans, bone scans, and Dr Rufus Cranker follow up moved up  She is agreeable to [address] locations whichever can get her in sooner for testing  She is also agreeable to having testing on different days as well  I placed call to central scheduling  Patient agreeable to and scheduled to have NM Bone scan completed 07/25/19 at 11am at the [facility]  CT C/A/P and Head scheduled for Sunday 07/28 at 11am also at the [facility]  Provided patient with and had her write down appointment date and time changes as well as detailed instructions for CT scan  To drink 1 bottle of contrast the evening before  To fast 3 hours prior to test, instructed to eat and take meds prior to 8am the morning of CT scan, fasting to start at 8am   Instructed to drink 1/2 of the second bottle at 10 am and take the other half with her to the appointment at which time they will instruct her when to drink the second half  Patient verbalized understanding but asked that I call her Friday to review CT scan instructions again  She feels she understanding changes for bone scan and needs no reminder  I will call patient Friday as request to review testing time and instructions  Staff message sent to [facility] informing on testing date changes and requesting appointment to follow up with Dr Rufus Cranker to discuss results also be moved up as suggested during this morning working group recommendations

## 2019-07-22 NOTE — PROGRESS NOTES
SOFIYA mailed a list of psychiatric providers to patient per her insurance coverage  SOFIYA did speak with the patient to let her know of this information coming in the mail, and she appreciated it  ANDRYW encouraged her to reach out if needed

## 2019-07-22 NOTE — TELEPHONE ENCOUNTER
Breast Cancer Nurse Navigator    Called patient to discuss moving up ordered testing CT scans and Bone scans as well as appointment with Dr Junior Farr  No answer, left detailed voicemail message regarding reason for call as noted  Requested return call as soon as possible to discuss preference to leave appointments as is or assist in making all appointments sooner  Left contact information and availability  Awaiting returning call

## 2019-07-23 ENCOUNTER — DOCUMENTATION (OUTPATIENT)
Dept: HEMATOLOGY ONCOLOGY | Facility: CLINIC | Age: 84
End: 2019-07-23

## 2019-07-23 ENCOUNTER — TELEPHONE (OUTPATIENT)
Dept: PALLIATIVE MEDICINE | Facility: CLINIC | Age: 84
End: 2019-07-23

## 2019-07-23 NOTE — PROGRESS NOTES
Breast Working Group [phone]- Recommended Physician Plan:  · Restaging scans, CT C/A/P, Head and Bone scan- Move up appointments  · Medical Oncology consult   Move up appointment and patient to see after scans are resulted  · Continue with Palliative care

## 2019-07-23 NOTE — TELEPHONE ENCOUNTER
----- Message from Pepper Spencer sent at 7/17/2019  5:04 PM EDT -----  Regarding: counseling/ therapy  I placed a referral to psychiatry in [facility] for this patient  I am looking to connect her with counseling/ therapy  Could someone please reach out to [facility] to see if they will call the patient to set up appointment or if I need to put a different order in? Thanks!

## 2019-07-23 NOTE — TELEPHONE ENCOUNTER
Phone call made to [facility] and left detailed message regarding the counseling/therapy referral made last week  Asked for call back to verify they will schedule with the patient   Stephanie Ingram

## 2019-07-25 ENCOUNTER — HOSPITAL ENCOUNTER (OUTPATIENT)
Dept: NUCLEAR MEDICINE | Facility: HOSPITAL | Age: 84
Discharge: HOME | End: 2019-07-25
Attending: SURGERY
Payer: COMMERCIAL

## 2019-07-25 DIAGNOSIS — C50.911 CARCINOMA OF RIGHT BREAST METASTATIC TO AXILLARY LYMPH NODE (HCC): ICD-10-CM

## 2019-07-25 DIAGNOSIS — Z17.1 MALIGNANT NEOPLASM OF UPPER-OUTER QUADRANT OF RIGHT BREAST IN FEMALE, ESTROGEN RECEPTOR NEGATIVE (HCC): ICD-10-CM

## 2019-07-25 DIAGNOSIS — C77.3 CARCINOMA OF RIGHT BREAST METASTATIC TO AXILLARY LYMPH NODE (HCC): ICD-10-CM

## 2019-07-25 DIAGNOSIS — C50.411 MALIGNANT NEOPLASM OF UPPER-OUTER QUADRANT OF RIGHT BREAST IN FEMALE, ESTROGEN RECEPTOR NEGATIVE (HCC): ICD-10-CM

## 2019-07-25 PROCEDURE — 78306 BONE IMAGING WHOLE BODY: CPT

## 2019-07-25 PROCEDURE — A9503 TC99M MEDRONATE: HCPCS

## 2019-07-26 ENCOUNTER — TELEPHONE (OUTPATIENT)
Dept: SURGICAL ONCOLOGY | Facility: CLINIC | Age: 84
End: 2019-07-26

## 2019-07-26 NOTE — TELEPHONE ENCOUNTER
Breast Cancer Nurse Navigator    Called and spoke to patient, instructed again in detail on instructions for CT C/A/P  Patient aware that she will drink first bottle of barium Saturday night prior to bed, she is to eat and take medication prior to 8am on Sunday 07/28/19  Fasting for 3 hours starting at 8 am and can drink clear fluids  Drink 1/2 of second bottle of barium at 10 am and take the other half with her for 11am appointment  Patient has instructions written down, did verbally go through details and verbalized clear understanding of instructions  Patient with no additional questions, concerns or needs  I will continue to follow up with patient as needed

## 2019-07-28 ENCOUNTER — HOSPITAL ENCOUNTER (OUTPATIENT)
Dept: CT IMAGING | Facility: HOSPITAL | Age: 84
Discharge: HOME | End: 2019-07-28
Attending: SURGERY
Payer: COMMERCIAL

## 2019-07-28 DIAGNOSIS — C77.3 CARCINOMA OF RIGHT BREAST METASTATIC TO AXILLARY LYMPH NODE (HCC): ICD-10-CM

## 2019-07-28 DIAGNOSIS — C50.411 MALIGNANT NEOPLASM OF UPPER-OUTER QUADRANT OF RIGHT BREAST IN FEMALE, ESTROGEN RECEPTOR NEGATIVE (HCC): ICD-10-CM

## 2019-07-28 DIAGNOSIS — C50.911 CARCINOMA OF RIGHT BREAST METASTATIC TO AXILLARY LYMPH NODE (HCC): ICD-10-CM

## 2019-07-28 DIAGNOSIS — Z17.1 MALIGNANT NEOPLASM OF UPPER-OUTER QUADRANT OF RIGHT BREAST IN FEMALE, ESTROGEN RECEPTOR NEGATIVE (HCC): ICD-10-CM

## 2019-07-28 PROCEDURE — 74176 CT ABD & PELVIS W/O CONTRAST: CPT

## 2019-07-28 PROCEDURE — 70450 CT HEAD/BRAIN W/O DYE: CPT

## 2019-07-28 PROCEDURE — 71250 CT THORAX DX C-: CPT

## 2019-08-05 ENCOUNTER — TELEPHONE (OUTPATIENT)
Dept: SURGICAL ONCOLOGY | Facility: CLINIC | Age: 84
End: 2019-08-05

## 2019-08-05 NOTE — TELEPHONE ENCOUNTER
Called and spoke to patient  As noted below as per Dr Raji Fry, provided patient with the results of her Bone scan, CT C/A/P and CT head  Explained findings at length during this call, answered patients questions on findings and also possible treatment options  Expressed firmly to patient that once Dr Queta Jamil reviews the results of the tests completed and does and assessment during her scheduled appointment Friday 08/09/19 at 4pm he will be able to discuss best treatment options going forward  Patient is very fearful of getting same dose of chemotherapy as last time due to all the side effects she had  Advised that since she was unable to tolerated that dose she may be offered a lower dose or different regimen, as will be determined and recommended by Dr Queta Jamil  Patient with lots of anxiety and as we talked about her current state and results further her anxiety decreased and she was thankful for the call, support and information provided  Per patient her son will accompany her to her appointment on Friday with Dr Queta Jamil  Patient with no additional questions, concerns or needs at this time    At patients request I will follow up with her before Friday

## 2019-08-05 NOTE — TELEPHONE ENCOUNTER
----- Message from Herman Krishnan MD sent at 8/2/2019  3:16 PM EDT -----  I got all her results; bone scan is good, CT of head is good, CT of chest/abd/pelvis they can see the spread in the breast/chest that I appreciate on exam, also there is a new spot in her liver, there is also some mild pleural thickening/fluid that is new    She is seeing Bobby Beasley next Friday    Will you be calling her prior with these results? I know she expressed wanting you to be the one who communicates with her

## 2019-08-08 ENCOUNTER — TELEPHONE (OUTPATIENT)
Dept: SURGICAL ONCOLOGY | Facility: CLINIC | Age: 84
End: 2019-08-08

## 2019-08-09 ENCOUNTER — OFFICE VISIT (OUTPATIENT)
Dept: HEMATOLOGY ONCOLOGY | Facility: CLINIC | Age: 84
End: 2019-08-09
Payer: COMMERCIAL

## 2019-08-09 VITALS
TEMPERATURE: 98.5 F | BODY MASS INDEX: 25.61 KG/M2 | WEIGHT: 122 LBS | HEIGHT: 58 IN | HEART RATE: 54 BPM | OXYGEN SATURATION: 97 % | RESPIRATION RATE: 18 BRPM | SYSTOLIC BLOOD PRESSURE: 152 MMHG | DIASTOLIC BLOOD PRESSURE: 80 MMHG

## 2019-08-09 DIAGNOSIS — C50.911 CARCINOMA OF RIGHT BREAST METASTATIC TO AXILLARY LYMPH NODE (HCC): Primary | ICD-10-CM

## 2019-08-09 DIAGNOSIS — C50.411 MALIGNANT NEOPLASM OF UPPER-OUTER QUADRANT OF RIGHT BREAST IN FEMALE, ESTROGEN RECEPTOR NEGATIVE (HCC): ICD-10-CM

## 2019-08-09 DIAGNOSIS — C77.3 CARCINOMA OF RIGHT BREAST METASTATIC TO AXILLARY LYMPH NODE (HCC): Primary | ICD-10-CM

## 2019-08-09 DIAGNOSIS — Z17.1 MALIGNANT NEOPLASM OF UPPER-OUTER QUADRANT OF RIGHT BREAST IN FEMALE, ESTROGEN RECEPTOR NEGATIVE (HCC): ICD-10-CM

## 2019-08-09 PROCEDURE — 99215 OFFICE O/P EST HI 40 MIN: CPT | Performed by: INTERNAL MEDICINE

## 2019-08-09 NOTE — PROGRESS NOTES
Hematology / Oncology Outpatient Follow Up Note    Hermilo Toth 82 y o  female [facility]:[id number]         Date:  2019    Assessment / Plan:  A 77-year-old postmenopausal woman with locally advanced right breast cancer, grade 3, triple negative disease   She has quite large right breast mass, palpable axillary adenopathy as well as what appeared to be skin involvement  She underwent 2 cycle of neoadjuvant chemotherapy with Taxotere and cyclophosphamide until 2019 which produced minor to partial response  However, she discontinued neoadjuvant chemotherapy  She did not wish to have mastectomy or continue with systemic chemotherapy  In the last 3-4 months, she has significant progression of disease with enlarging right breast mass, numerous skin metastasis as well as new liver lesion  Now she has metastatic breast cancer, triple negative disease  She presents today with her son to discuss further management  We had extensive discussion regarding metastatic triple negative breast cancer which give her a quite poor prognosis  Life expectancy is likely to be 6 months or possibly less  Following 2 options were thoroughly discussed  1  Palliative chemotherapy with capecitabine  2  Hospice care  Pros and cons of above 2 options were thoroughly discussed  After the lengthy discussion, she wished to be on hospice care which is agreeable to her son    We will make a hospice referral   All the patient and her son's questions were answered to their satisfaction                                                                                                                                                Subjective:      HPI:  A 82year-old postmenopausal woman who noticed large right breast mass and developed breast pain which she brought to medical attention  Aria Villela was found to have 3 5 x 2 x 3 0 cm of mass at 9:00 a m  position 5 cm from nipple   She also has palpable right axillary adenopathy  Biopsy from right breast in February 14, 2019 showed invasive ductal carcinoma, grade 3   This was triple negative disease   She was seen by Dr Lawanda Gill  Esperanza Braxton is going to have CT scan of chest abdomen pelvis as well as bone scan later this week  Esperanza Braxton presents today to discuss possible neoadjuvant chemotherapy   She has no other symptomatology   She denied respiratory symptoms such as cough, sputum production or shortness of breast   She has no recent weight loss  She denied any bone pain  She has past medical history of hypertension, arthritis as well as hypercholesteremia   She denied any family history of breast or ovarian cancer  Esperanza Braxton is a lifetime never smoker   She lived by herself  [facility], her son lived nearby            Interval History:   A 82year-old postmenopausal woman with  locally advanced right breast cancer, grade 3, triple negative disease   She has large right breast mass, skin involvement as well as known right axillary adenopathy  CT scan of chest abdomen pelvis as well as bone scan showed no evidence of distant metastasis   She started neoadjuvant chemotherapy with Taxotere and cyclophosphamide  She discontinued neoadjuvant chemotherapy after 2 cycle treatment due to the severe fatigue  She declined mastectomy  She did not wish to continue with systemic chemotherapy  Since April 2019, she has been off the treatment  With 2 cycle of neoadjuvant therapy, she has minor to partial response  However, in the last 3-4 months, she has enlarging right breast mass with extensive skin metastasis which is getting close to the left chest wall  Recent CT scan of chest abdomen pelvis showed 6 mm of liver lesion, suspicious for metastatic disease  She presents today with her son to discuss further management  She has some pain in the right breast   Otherwise, she is still functional   She has no respiratory symptoms  Her weight is stable    Her performance status is 0 to 1/4 on the ECOG scale                              Objective:      Primary Diagnosis:     1  At least locally advanced right breast cancer, grade 3, triple negative disease   Diagnosed in February 2019     2  Metastatic breast cancer, diagnosed in August 2019       Cancer Staging:  Cancer Staging  Malignant neoplasm of upper-outer quadrant of right female breast ([facility] [address] )  Staging form: Breast, AJCC 8th Edition  - Clinical: cT2, cN1(f), cM0, G3, ER: Unknown, MT: Unknown, HER2: Unknown - Signed by Heather Winter MD on 2/18/2019           Previous Hematologic/ Oncologic Treatment:       Neoadjuvant chemotherapy with Taxotere and cyclophosphamide x2 cycle, completed in April 2019      Current Hematologic/ Oncologic Treatment:       Hospice care      Disease Status:      Clinical minor to partial response      Test Results:     Pathology:     Right breast biopsy showed invasive ductal carcinoma, grade 3   ER negative, MT negative, HER2 negative disease      Radiology:        CT scan of chest abdomen pelvis in August 2019 showed large right breast mass as well as skin involvement  Enlarging right axillary adenopathy  6 mm of liver lesion, suspicious for metastatic disease      Bone scan showed no evidence of osseous metastasis in August 2019      Laboratory:     CBC and CMP are within normal limits      Physical Exam:        General Appearance:    Alert, oriented          Eyes:    PERRL   Ears:    Normal external ear canals, both ears   Nose:   Nares normal, septum midline   Throat:   Mucosa moist  Pharynx without injection  Neck:   Supple         Lungs:     Clear to auscultation bilaterally   Chest Wall:    No tenderness or deformity    Heart:    Regular rate and rhythm         Abdomen:     Soft, non-tender, bowel sounds +, no organomegaly               Extremities:   Extremities no cyanosis or edema         Skin:   no rash or icterus      Lymph nodes:   Cervical, supraclavicular, and axillary nodes normal   Neurologic:   CNII-XII intact, normal strength, sensation and reflexes     Throughout             Breast exam:   Large right breast mass measuring 13 x 11 cm with numerous skin lesion of metastatic disease in entire right chest wall  Left breast exam is negative  ROS: Review of Systems   All other systems reviewed and are negative  Imaging: Ct Chest Abdomen Pelvis Wo Contrast    Result Date: 8/1/2019  Narrative: CT CHEST, ABDOMEN AND PELVIS WITHOUT IV CONTRAST INDICATION:   C50 911: Malignant neoplasm of unspecified site of right female breast C77 3: Secondary and unspecified malignant neoplasm of axilla and upper limb lymph nodes C50 411: Malignant neoplasm of upper-outer quadrant of right female breast Z17 1: Estrogen receptor negative status (ER-)  Headaches  Head pressure  COMPARISON:  3/8/2019 TECHNIQUE: CT examination of the chest, abdomen and pelvis was performed without intravenous contrast   Axial, sagittal, and coronal 2D reformatted images were created from the source data and submitted for interpretation  Radiation dose length product (DLP) for this visit:  456 mGy-cm   This examination, like all CT scans performed in the [facility], was performed utilizing techniques to minimize radiation dose exposure, including the use of iterative reconstruction and automated exposure control  Enteric contrast was administered  FINDINGS: CHEST LUNGS:  Motion artifact is noted  There is some atelectasis in the right middle lobe  There is also some minimal bibasilar bilateral lower lobe atelectasis  There are no lung masses or suspicious infiltrates  The airways are clear  No nodularity appreciated  PLEURA:  There is minimal right pleural fluid or right pleural thickening posteriorly, new from the prior  The left pleural space is unremarkable  HEART/GREAT VESSELS:  Heart size is normal   There is no pericardial effusion    There is minimal calcification of coronary arteries and at the root of the aorta  Aortic caliber appears normal   Without intravenous contrast, patency of vessels is not able to be assessed  MEDIASTINUM AND MAIN:  Unremarkable  CHEST WALL AND LOWER NECK:   There is skin thickening and edematous appearance of the right breast which appears stable to slightly improved when compared with the prior study  However, on today's exam there now seems to be some skin thickening of the left breast which is entirely new  There is subcutaneous fat stranding extending along the anterior right chest wall below the level of the breast and downward along the right anterior abdominal wall and flank region  This area seems similar to the prior study  This could be cellulitis or edema or potentially some radiation change if the patient received radiation to this area  There are persistent abnormally enlarged right axillary lymph nodes identified  Currently, the largest lymph node is 1 4 x 2 1 cm on image 15 series 2  There is a low-density left thyroid nodule or cyst   It is 1 5 cm diameter and is stable to slightly diminished in size from the prior exam  ABDOMEN LIVER/BILIARY TREE:  On image 58 series 2 there is a 6 mm hypodense lesion in the liver  This appears new from the prior study  It cannot be characterized on this exam   However, in a patient with a known malignancy, the possibility of metastatic deposit could not be excluded  GALLBLADDER:  No calcified gallstones  No pericholecystic inflammatory change  SPLEEN:  Unremarkable  PANCREAS:  Unremarkable  ADRENAL GLANDS:  Unremarkable  KIDNEYS/URETERS:  Unremarkable  No hydronephrosis  STOMACH AND BOWEL:  Unremarkable  APPENDIX:  No findings to suggest appendicitis  ABDOMINOPELVIC CAVITY:  No ascites or free intraperitoneal air  No lymphadenopathy  VESSELS:  Unremarkable for patient's age  PELVIS REPRODUCTIVE ORGANS:  Patient reportedly status post hysterectomy  No gross evidence of pelvic mass    Multiple unenhanced bowel loops are present in the pelvic area  URINARY BLADDER:  Unremarkable  ABDOMINAL WALL/INGUINAL REGIONS:  Other than the subcutaneous fat stranding of the right abdominal wall mentioned above, the remainder of the abdominal wall seems unremarkable  OSSEOUS STRUCTURES:  There is osteoarthritis of the hips  There is also osteoarthritis of the lower lumbar facet joints and there is chronic multilevel disc degeneration of the lumbar spine as well as a moderate levoscoliosis of thoracolumbar spine  No  fractures or destructive bone lesions are appreciated  Impression: New 6 mm indeterminate liver lesion inferiorly in the left hepatic lobe  The possibility of metastatic deposit could not be excluded  Dedicated hepatic imaging suggested with MRI or hepatic protocol CT, as deemed safe and appropriate for this patient  Interval development of skin thickening of the left breast   Etiology uncertain  The right breast skin thickening/edema seems stable to improved from the prior exam  Persistent right axillary lymphadenopathy  Minimal right pleural fluid or pleural thickening posteriorly, new from prior  Edematous appearance of the upper abdominal wall and right flank similar to the prior exam  The study was marked in EPIC for significant notification  Workstation performed: [id number]     Ct Head Wo Contrast    Result Date: 7/30/2019  Narrative: CT BRAIN - WITHOUT CONTRAST INDICATION:   C50 911: Malignant neoplasm of unspecified site of right female breast C77 3: Secondary and unspecified malignant neoplasm of axilla and upper limb lymph nodes C50 411: Malignant neoplasm of upper-outer quadrant of right female breast Z17 1: Estrogen receptor negative status (ER-)  COMPARISON:  None  TECHNIQUE:  CT examination of the brain was performed  In addition to axial images, coronal 2D reformatted images were created and submitted for interpretation  Radiation dose length product (DLP) for this visit:  882 mGy-cm     This examination, like all CT scans performed in the [facility], was performed utilizing techniques to minimize radiation dose exposure, including the use of iterative reconstruction and automated exposure control  IMAGE QUALITY:  Diagnostic  FINDINGS: PARENCHYMA:  No intracranial mass, mass effect or midline shift  No CT signs of acute infarction  No acute parenchymal hemorrhage  Minor, age-appropriate volume loss, mild degree of chronic small vessel disease  VENTRICLES AND EXTRA-AXIAL SPACES:  Normal for the patient's age  VISUALIZED ORBITS AND PARANASAL SINUSES:  Unremarkable  CALVARIUM AND EXTRACRANIAL SOFT TISSUES:  Degenerative changes left temporomandibular joint  Impression: No acute disease  No hemorrhage, mass or edema on this enhanced CT exam   Only as clinical concern dictates, contrasted MR could be performed if not otherwise contraindicated, to more accurately assess for metastatic disease  Workstation performed: [id number]     Nm Bone Scan Whole Body    Result Date: 7/25/2019  Narrative: BONE SCAN  WHOLE BODY INDICATION: C50 911: Malignant neoplasm of unspecified site of right female breast C77 3: Secondary and unspecified malignant neoplasm of axilla and upper limb lymph nodes C50 411: Malignant neoplasm of upper-outer quadrant of right female breast Z17 1: Estrogen receptor negative status (ER-) PREVIOUS FILM CORRELATION:    Prior bone scan 3/8/2019 TECHNIQUE:   This study was performed following the intravenous administration of 24 4 mCi Tc-99m labeled MDP  Delayed, anterior and posterior whole body images were acquired, 2-3 hours after radiopharmaceutical administration  FINDINGS:  There is no scintigraphic evidence of osseous metastasis  As on previous study, thoracolumbar scoliosis noted with heterogeneous activity in the lumbar spine most suggestive of degenerative change  There are no suspicious abnormality is noted in the long bone shafts, pelvis or skull       Impression: There is no interval change since the prior study  No scintigraphic evidence of osseous metastasis Workstation performed: [id number]         Labs:   Lab Results   Component Value Date    WBC 15 41 (H) 04/11/2019    HGB 11 5 04/11/2019    HCT 35 4 04/11/2019    MCV 89 04/11/2019     04/11/2019     Lab Results   Component Value Date     12/27/2017    K 3 3 (L) 04/11/2019     04/11/2019    CO2 28 04/11/2019    ANIONGAP 6 09/08/2015    BUN 10 04/11/2019    CREATININE 0 79 04/11/2019    GLUCOSE 86 09/08/2015    GLUF 82 04/11/2019    CALCIUM 9 3 04/11/2019    AST 45 04/11/2019    ALT 73 04/11/2019    ALKPHOS 155 (H) 04/11/2019    PROT 7 2 12/27/2017    BILITOT 0 4 12/27/2017    EGFR 81 04/11/2019         Lab Results   Component Value Date    MYJNYVSU43 1,265 (H) 07/23/2018         Current Medications: Reviewed  Allergies: Reviewed  PMH/FH/SH:  Reviewed      Vital Sign:    Body surface area is 1 47 meters squared      Wt Readings from Last 3 Encounters:   08/09/19 55 3 kg (122 lb)   07/17/19 52 5 kg (115 lb 12 8 oz)   07/17/19 51 5 kg (113 lb 9 6 oz)        Temp Readings from Last 3 Encounters:   08/09/19 98 5 °F (36 9 °C) (Tympanic Core)   07/17/19 (!) 97 4 °F (36 3 °C) (Tympanic)   06/05/19 98 5 °F (36 9 °C) (Oral)        BP Readings from Last 3 Encounters:   08/09/19 152/80   07/17/19 150/70   07/17/19 150/70         Pulse Readings from Last 3 Encounters:   08/09/19 (!) 54   07/17/19 56   07/17/19 56     @LASTSAO2(3)@

## 2019-08-12 DIAGNOSIS — G62.9 NEUROPATHY: ICD-10-CM

## 2019-08-12 DIAGNOSIS — F33.9 EPISODE OF RECURRENT MAJOR DEPRESSIVE DISORDER, UNSPECIFIED DEPRESSION EPISODE SEVERITY (HCC): ICD-10-CM

## 2019-08-12 DIAGNOSIS — F41.9 ANXIETY: ICD-10-CM

## 2019-08-13 RX ORDER — GABAPENTIN 300 MG/1
300 CAPSULE ORAL
Qty: 30 CAPSULE | Refills: 0 | Status: SHIPPED | OUTPATIENT
Start: 2019-08-13

## 2019-08-13 RX ORDER — VENLAFAXINE HYDROCHLORIDE 75 MG/1
75 CAPSULE, EXTENDED RELEASE ORAL DAILY
Qty: 30 CAPSULE | Refills: 0 | Status: SHIPPED | OUTPATIENT
Start: 2019-08-13

## 2019-08-16 ENCOUNTER — TELEPHONE (OUTPATIENT)
Dept: SURGICAL ONCOLOGY | Facility: CLINIC | Age: 84
End: 2019-08-16

## 2019-08-16 NOTE — TELEPHONE ENCOUNTER
Breast cancer nurse navigator    Spoke to patient who called to update me on her appointment with Dr Bailee Reinoso  We discussed her diagnosis, prognosis, goals, quality of life concerns, wound concerns, hospice versus palliative care  Provided emotional support and encouragement  Discussed death and dying  Patient I feel is very realistic about her diagnosis  She wishes to remain comfortable, have wound treated so she doesn't have to worry about drainage and odor as she still plans to go out and stay active as long as she can  Patient has an appointment with Edgar mancera on Tuesday 08/20/19  I advised that she discuss these same topics with Opal Fox and also allow her to assess breast wound and refer to wound center if appropriate for management  Patient made aware wound may not heal and its expected that more will form and worsen, her goals is management if possible and to remain comfortable  Answered all of patients questions to her satisfaction  Encouraged to continue to call as needed with any questions, concerns or needs  I will continue to follow up with patient as needed  Timmy Waggoner

## 2019-08-20 ENCOUNTER — OFFICE VISIT (OUTPATIENT)
Dept: PALLIATIVE MEDICINE | Facility: CLINIC | Age: 84
End: 2019-08-20
Payer: COMMERCIAL

## 2019-08-20 VITALS
SYSTOLIC BLOOD PRESSURE: 160 MMHG | RESPIRATION RATE: 20 BRPM | BODY MASS INDEX: 25.97 KG/M2 | DIASTOLIC BLOOD PRESSURE: 86 MMHG | HEART RATE: 55 BPM | TEMPERATURE: 98.1 F | OXYGEN SATURATION: 96 % | WEIGHT: 123.4 LBS

## 2019-08-20 DIAGNOSIS — C77.3 CARCINOMA OF RIGHT BREAST METASTATIC TO AXILLARY LYMPH NODE (HCC): ICD-10-CM

## 2019-08-20 DIAGNOSIS — R60.0 EDEMA OF UPPER EXTREMITY: ICD-10-CM

## 2019-08-20 DIAGNOSIS — F33.9 EPISODE OF RECURRENT MAJOR DEPRESSIVE DISORDER, UNSPECIFIED DEPRESSION EPISODE SEVERITY (HCC): ICD-10-CM

## 2019-08-20 DIAGNOSIS — C50.911 CARCINOMA OF RIGHT BREAST METASTATIC TO AXILLARY LYMPH NODE (HCC): ICD-10-CM

## 2019-08-20 DIAGNOSIS — F41.9 ANXIETY: ICD-10-CM

## 2019-08-20 DIAGNOSIS — Z17.1 MALIGNANT NEOPLASM OF UPPER-OUTER QUADRANT OF RIGHT BREAST IN FEMALE, ESTROGEN RECEPTOR NEGATIVE (HCC): Primary | ICD-10-CM

## 2019-08-20 DIAGNOSIS — C50.411 MALIGNANT NEOPLASM OF UPPER-OUTER QUADRANT OF RIGHT BREAST IN FEMALE, ESTROGEN RECEPTOR NEGATIVE (HCC): Primary | ICD-10-CM

## 2019-08-20 PROCEDURE — 99214 OFFICE O/P EST MOD 30 MIN: CPT | Performed by: NURSE PRACTITIONER

## 2019-08-20 NOTE — PATIENT INSTRUCTIONS
Swelling of your right arm is probably due to the cancer which has spread into the lymph nodes  However we will check ultrasound of right arm to make sure there is not a blood clot  For now, apply triple antibiotic cream to wound, then cover with non-stick dressing  Change twice daily  Hospice should be calling to set up appointment with you  Have your son attend meeting with you  Let me know if you don't hear from hospice in next couple days

## 2019-08-20 NOTE — PROGRESS NOTES
Palliative and Supportive Care   Luis Cross 82 y o  female [id number]    Assessment/Plan:  1  Malignant neoplasm of upper-outer quadrant of right breast in female, estrogen receptor negative ([facility] [address] )    2  Edema of upper extremity    3  Carcinoma of right breast 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 ([facility] [address] )    4  Anxiety    5  Episode of recurrent major depressive disorder, unspecified depression episode severity ([facility] [address] )        We discussed home hospice further and she is interested  I will reach out to the hospice team and let them know  They still have an open referral for her (placed by Dr Lobito Javier)  For now, changed dressing of breast to triple antibiotic gel, covered with abdominal pad, asked patient to look for non-adhesive dressings at pharmacy and change BID  She will likely need topical metronidazole sprinkles for odor   And/or   Santyl for slough of wound  No changes to medications   Continue venlafaxine and gabapentin  Edema of RUE is likely due to lymphadenopathy however will check venous doppler to rule out DVT  Subjective    Chief Concern  Follow up visit for:  Symptom management         History of Present Illness  Patient ID: Luis Cross is a 82 y o  female with right breast cancer diagnosed Feb 2019, found to be locally advanced, triple negative  She underwent two cycles of Taxotere and cyclophosphamide  Decided not to continue chemotherapy or undergo mastectomy or further cancer treatments  More recently decided to reconsider further treatment- underwent imaging which demonstrated enlarging right breast mass and likely new liver lesion; she has also had skin involvement  Met with Dr Lobito Javier who explained poor prognosis and options, she elected for hospice  She has not yet been ready to sign on though  Presents to visit alone today  She has one son who lives locally  Patient lives alone in an apartment       She is concerned regarding open wounds of right breast which have developed and progressed significantly over past month  Many wounds have yellow drainage, others have been bleeding  She has been applying gauze  She notes a foul smell  She has occasional shooting pains of this breast    She has firm mass of right breast and palpable lymphadenopathy  She has edema of R arm which has developed over past one week  The following portions of the patient's history were reviewed and updated as appropriate: allergies, current medications, past family history, past medical history, past social history, past surgical history and problem list       Visit Information    Accompanied By: No one  Source of History: Self  History Limitations: None    ROS  Review of Systems   Constitutional: Negative  Skin: Positive for wound  Neurological:        Neuropathies   Psychiatric/Behavioral: The patient is nervous/anxious  Objective     Physical Exam   Constitutional: She is oriented to person, place, and time  She is cooperative  Pulmonary/Chest: Effort normal    Lymphadenopathy:     She has axillary adenopathy (right)  Edema of RUE   Neurological: She is alert and oriented to person, place, and time  Skin: Skin is warm and dry  Fungating wounds of right breast, open areas, areas of yellow slough, scant yellow drainage, mild foul odor   Psychiatric: She has a normal mood and affect   Cognition and memory are normal          /86 (BP Location: Right arm, Patient Position: Sitting, Cuff Size: Standard)   Pulse 55   Temp 98 1 °F (36 7 °C) (Oral)   Resp 20   Wt 56 kg (123 lb 6 4 oz)   SpO2 96%   BMI 25 97 kg/m²           Current Outpatient Medications:     acetaminophen (TYLENOL) 325 mg tablet, Take 325 mg by mouth 2 (two) times a day, Disp: , Rfl:     amLODIPine-benazepril (LOTREL) 10-20 MG per capsule, Take 1 capsule by mouth daily, Disp: 90 capsule, Rfl: 3    aspirin 81 MG tablet, Take 1 tablet by mouth daily, Disp: , Rfl:     Calcium-Magnesium 333-167 MG TABS, Take by mouth, Disp: , Rfl:     Cholecalciferol (VITAMIN D3) 1000 units CAPS, Take 400 Units by mouth  , Disp: , Rfl:     gabapentin (NEURONTIN) 300 mg capsule, Take 1 capsule (300 mg total) by mouth daily at bedtime, Disp: 30 capsule, Rfl: 0    hydrALAZINE (APRESOLINE) 25 mg tablet, Take 1 tablet (25 mg total) by mouth 2 (two) times a day, Disp: 180 tablet, Rfl: 1    LORazepam (ATIVAN) 1 mg tablet, Take 1 tablet (1 mg total) by mouth every 8 (eight) hours as needed for anxiety, Disp: 90 tablet, Rfl: 1    LUMIGAN 0 01 % ophthalmic drops, , Disp: , Rfl:     meclizine (ANTIVERT) 25 mg tablet, Take 1 tablet (25 mg total) by mouth every 8 (eight) hours as needed for dizziness, Disp: 50 tablet, Rfl: 0    metoprolol tartrate (LOPRESSOR) 100 mg tablet, Take 1 tablet (100 mg total) by mouth every 12 (twelve) hours, Disp: 180 tablet, Rfl: 3    Multiple Vitamins-Minerals (WOMENS 50+ MULTI VITAMIN/MIN PO), Take by mouth, Disp: , Rfl:     ondansetron (ZOFRAN) 4 mg tablet, Take 1 tablet (4 mg total) by mouth every 8 (eight) hours as needed for nausea or vomiting, Disp: 50 tablet, Rfl: 1    simvastatin (ZOCOR) 40 mg tablet, Take 40 mg by mouth daily at bedtime, Disp: , Rfl:     venlafaxine (EFFEXOR-XR) 75 mg 24 hr capsule, Take 1 capsule (75 mg total) by mouth daily, Disp: 30 capsule, Rfl: 0      Edgar Vera, [address] [place] [address] S and Supportive Care  [phone]

## 2019-08-21 ENCOUNTER — HOSPITAL ENCOUNTER (OUTPATIENT)
Dept: NON INVASIVE DIAGNOSTICS | Facility: HOSPITAL | Age: 84
Discharge: HOME | End: 2019-08-21
Payer: COMMERCIAL

## 2019-08-21 DIAGNOSIS — C77.3 CARCINOMA OF RIGHT BREAST METASTATIC TO AXILLARY LYMPH NODE (HCC): ICD-10-CM

## 2019-08-21 DIAGNOSIS — C50.911 CARCINOMA OF RIGHT BREAST METASTATIC TO AXILLARY LYMPH NODE (HCC): ICD-10-CM

## 2019-08-21 DIAGNOSIS — R60.0 EDEMA OF UPPER EXTREMITY: ICD-10-CM

## 2019-08-21 PROCEDURE — 93971 EXTREMITY STUDY: CPT | Performed by: SURGERY

## 2019-08-21 PROCEDURE — 93971 EXTREMITY STUDY: CPT

## 2019-08-22 ENCOUNTER — TELEPHONE (OUTPATIENT)
Dept: PALLIATIVE MEDICINE | Facility: CLINIC | Age: 84
End: 2019-08-22

## 2019-08-22 NOTE — TELEPHONE ENCOUNTER
Called patient to let her know that there was no DVT on doppler  Austin Allen from hospice liaison that she has signed onto hospice however, per patient preference, won't be opened until next week

## 2019-08-27 ENCOUNTER — TELEPHONE (OUTPATIENT)
Dept: FAMILY MEDICINE CLINIC | Facility: CLINIC | Age: 84
End: 2019-08-27

## 2019-09-05 ENCOUNTER — TELEPHONE (OUTPATIENT)
Dept: FAMILY MEDICINE CLINIC | Facility: CLINIC | Age: 84
End: 2019-09-05

## 2019-10-04 ENCOUNTER — TELEPHONE (OUTPATIENT)
Dept: SURGICAL ONCOLOGY | Facility: CLINIC | Age: 84
End: 2019-10-04

## 2019-10-04 NOTE — TELEPHONE ENCOUNTER
Breast Cancer Nurse Navigator      Called patient to evaluate adequate symptom management while on hospice and for any questions or needs  Patient reports that she is hanging in there  She did sound medicated and tired, she does have a lot of pain but it is managed well with pain medications she is being given as per patient  Patient advised that she is comfortable and has all the equipment she needs in her home to keep her comfortable and safe  Also informed that her son comes to her home after work and stays there every night to care for her  Patient remains spiritually positive and accepting of end of life  I offered emotional support and encouragement, patient asked for ongoing follow up as she enjoys our calls and appreciates the support given  I ensured patient that I will maintain follow up  I will continue to follow up with patient as requested

## 2019-10-28 ENCOUNTER — TELEPHONE (OUTPATIENT)
Dept: FAMILY MEDICINE CLINIC | Facility: CLINIC | Age: 84
End: 2019-10-28

## 2019-11-03 ENCOUNTER — TELEPHONE (OUTPATIENT)
Dept: OTHER | Facility: OTHER | Age: 84
End: 2019-11-03